# Patient Record
Sex: FEMALE | Race: WHITE | NOT HISPANIC OR LATINO | Employment: PART TIME | URBAN - METROPOLITAN AREA
[De-identification: names, ages, dates, MRNs, and addresses within clinical notes are randomized per-mention and may not be internally consistent; named-entity substitution may affect disease eponyms.]

---

## 2017-04-13 ENCOUNTER — APPOINTMENT (OUTPATIENT)
Dept: GENERAL RADIOLOGY | Facility: CLINIC | Age: 27
End: 2017-04-13
Attending: PHYSICIAN ASSISTANT

## 2017-04-13 ENCOUNTER — HOSPITAL ENCOUNTER (EMERGENCY)
Facility: CLINIC | Age: 27
Discharge: ANOTHER HOSPITAL | End: 2017-04-13
Attending: PHYSICIAN ASSISTANT | Admitting: PHYSICIAN ASSISTANT

## 2017-04-13 ENCOUNTER — HOSPITAL ENCOUNTER (OUTPATIENT)
Facility: CLINIC | Age: 27
Discharge: HOME | End: 2017-04-13
Attending: HOSPITALIST | Admitting: HOSPITALIST

## 2017-04-13 VITALS — DIASTOLIC BLOOD PRESSURE: 63 MMHG | OXYGEN SATURATION: 98 % | SYSTOLIC BLOOD PRESSURE: 113 MMHG | TEMPERATURE: 102.8 F

## 2017-04-13 VITALS
TEMPERATURE: 97.6 F | DIASTOLIC BLOOD PRESSURE: 83 MMHG | OXYGEN SATURATION: 97 % | SYSTOLIC BLOOD PRESSURE: 111 MMHG | RESPIRATION RATE: 12 BRPM

## 2017-04-13 DIAGNOSIS — I48.0 PAROXYSMAL ATRIAL FIBRILLATION (H): Primary | ICD-10-CM

## 2017-04-13 DIAGNOSIS — I48.91 ATRIAL FIBRILLATION WITH RAPID VENTRICULAR RESPONSE (H): Primary | ICD-10-CM

## 2017-04-13 LAB
ALBUMIN SERPL-MCNC: 3.9 G/DL (ref 3.4–5)
ALP SERPL-CCNC: 76 U/L (ref 40–150)
ALT SERPL W P-5'-P-CCNC: 38 U/L (ref 0–50)
AMPHETAMINES UR QL SCN: ABNORMAL
ANION GAP SERPL CALCULATED.3IONS-SCNC: 12 MMOL/L (ref 3–14)
AST SERPL W P-5'-P-CCNC: 23 U/L (ref 0–45)
BARBITURATES UR QL: ABNORMAL
BASOPHILS # BLD AUTO: 0 10E9/L (ref 0–0.2)
BASOPHILS NFR BLD AUTO: 0.3 %
BENZODIAZ UR QL: ABNORMAL
BILIRUB SERPL-MCNC: 0.7 MG/DL (ref 0.2–1.3)
BUN SERPL-MCNC: 18 MG/DL (ref 7–30)
CALCIUM SERPL-MCNC: 9 MG/DL (ref 8.5–10.1)
CANNABINOIDS UR QL SCN: ABNORMAL
CHLORIDE SERPL-SCNC: 108 MMOL/L (ref 94–109)
CO2 SERPL-SCNC: 22 MMOL/L (ref 20–32)
COCAINE UR QL: ABNORMAL
CREAT SERPL-MCNC: 0.71 MG/DL (ref 0.52–1.04)
DIFFERENTIAL METHOD BLD: ABNORMAL
EOSINOPHIL # BLD AUTO: 0.1 10E9/L (ref 0–0.7)
EOSINOPHIL NFR BLD AUTO: 0.4 %
ERYTHROCYTE [DISTWIDTH] IN BLOOD BY AUTOMATED COUNT: 12.1 % (ref 10–15)
GFR SERPL CREATININE-BSD FRML MDRD: ABNORMAL ML/MIN/1.7M2
GLUCOSE SERPL-MCNC: 105 MG/DL (ref 70–99)
HCG SERPL QL: NEGATIVE
HCT VFR BLD AUTO: 44.1 % (ref 35–47)
HGB BLD-MCNC: 15.5 G/DL (ref 11.7–15.7)
IMM GRANULOCYTES # BLD: 0 10E9/L (ref 0–0.4)
IMM GRANULOCYTES NFR BLD: 0.3 %
LYMPHOCYTES # BLD AUTO: 1.9 10E9/L (ref 0.8–5.3)
LYMPHOCYTES NFR BLD AUTO: 16.4 %
MAGNESIUM SERPL-MCNC: 2.3 MG/DL (ref 1.6–2.3)
MCH RBC QN AUTO: 32.4 PG (ref 26.5–33)
MCHC RBC AUTO-ENTMCNC: 35.1 G/DL (ref 31.5–36.5)
MCV RBC AUTO: 92 FL (ref 78–100)
MONOCYTES # BLD AUTO: 0.7 10E9/L (ref 0–1.3)
MONOCYTES NFR BLD AUTO: 5.6 %
NEUTROPHILS # BLD AUTO: 8.9 10E9/L (ref 1.6–8.3)
NEUTROPHILS NFR BLD AUTO: 77 %
OPIATES UR QL SCN: ABNORMAL
PCP UR QL SCN: ABNORMAL
PLATELET # BLD AUTO: 223 10E9/L (ref 150–450)
POTASSIUM SERPL-SCNC: 4 MMOL/L (ref 3.4–5.3)
PROT SERPL-MCNC: 7.6 G/DL (ref 6.8–8.8)
RBC # BLD AUTO: 4.78 10E12/L (ref 3.8–5.2)
SODIUM SERPL-SCNC: 142 MMOL/L (ref 133–144)
TROPONIN I SERPL-MCNC: NORMAL UG/L (ref 0–0.04)
TSH SERPL DL<=0.005 MIU/L-ACNC: 1.21 MU/L (ref 0.4–4)
WBC # BLD AUTO: 11.6 10E9/L (ref 4–11)

## 2017-04-13 PROCEDURE — 25000132 ZZH RX MED GY IP 250 OP 250 PS 637: Performed by: PHYSICIAN ASSISTANT

## 2017-04-13 PROCEDURE — 25000125 ZZHC RX 250: Performed by: EMERGENCY MEDICINE

## 2017-04-13 PROCEDURE — 71020 XR CHEST 2 VW: CPT

## 2017-04-13 PROCEDURE — 99285 EMERGENCY DEPT VISIT HI MDM: CPT | Mod: 25 | Performed by: EMERGENCY MEDICINE

## 2017-04-13 PROCEDURE — 96374 THER/PROPH/DIAG INJ IV PUSH: CPT

## 2017-04-13 PROCEDURE — 84443 ASSAY THYROID STIM HORMONE: CPT | Performed by: PHYSICIAN ASSISTANT

## 2017-04-13 PROCEDURE — 25000128 H RX IP 250 OP 636: Performed by: PHYSICIAN ASSISTANT

## 2017-04-13 PROCEDURE — 85025 COMPLETE CBC W/AUTO DIFF WBC: CPT | Performed by: PHYSICIAN ASSISTANT

## 2017-04-13 PROCEDURE — 25000132 ZZH RX MED GY IP 250 OP 250 PS 637: Performed by: EMERGENCY MEDICINE

## 2017-04-13 PROCEDURE — 83735 ASSAY OF MAGNESIUM: CPT | Performed by: PHYSICIAN ASSISTANT

## 2017-04-13 PROCEDURE — 96361 HYDRATE IV INFUSION ADD-ON: CPT

## 2017-04-13 PROCEDURE — 93010 ELECTROCARDIOGRAM REPORT: CPT | Performed by: EMERGENCY MEDICINE

## 2017-04-13 PROCEDURE — 80053 COMPREHEN METABOLIC PANEL: CPT | Performed by: PHYSICIAN ASSISTANT

## 2017-04-13 PROCEDURE — 99285 EMERGENCY DEPT VISIT HI MDM: CPT | Mod: 25

## 2017-04-13 PROCEDURE — 84703 CHORIONIC GONADOTROPIN ASSAY: CPT | Performed by: PHYSICIAN ASSISTANT

## 2017-04-13 PROCEDURE — 96376 TX/PRO/DX INJ SAME DRUG ADON: CPT

## 2017-04-13 PROCEDURE — 80307 DRUG TEST PRSMV CHEM ANLYZR: CPT | Performed by: EMERGENCY MEDICINE

## 2017-04-13 PROCEDURE — 96375 TX/PRO/DX INJ NEW DRUG ADDON: CPT

## 2017-04-13 PROCEDURE — 99219 ZZC INITIAL OBSERVATION CARE,LEVL II: CPT | Performed by: HOSPITALIST

## 2017-04-13 PROCEDURE — 99284 EMERGENCY DEPT VISIT MOD MDM: CPT | Mod: 25

## 2017-04-13 PROCEDURE — 93005 ELECTROCARDIOGRAM TRACING: CPT

## 2017-04-13 PROCEDURE — 25000128 H RX IP 250 OP 636: Performed by: EMERGENCY MEDICINE

## 2017-04-13 PROCEDURE — 84484 ASSAY OF TROPONIN QUANT: CPT | Performed by: PHYSICIAN ASSISTANT

## 2017-04-13 RX ORDER — METOPROLOL TARTRATE 25 MG/1
25 TABLET, FILM COATED ORAL ONCE
Status: COMPLETED | OUTPATIENT
Start: 2017-04-13 | End: 2017-04-13

## 2017-04-13 RX ORDER — ONDANSETRON 2 MG/ML
4 INJECTION INTRAMUSCULAR; INTRAVENOUS ONCE
Status: COMPLETED | OUTPATIENT
Start: 2017-04-13 | End: 2017-04-13

## 2017-04-13 RX ORDER — METOPROLOL TARTRATE 1 MG/ML
5 INJECTION, SOLUTION INTRAVENOUS EVERY 5 MIN PRN
Status: DISCONTINUED | OUTPATIENT
Start: 2017-04-13 | End: 2017-04-13 | Stop reason: HOSPADM

## 2017-04-13 RX ORDER — METOPROLOL TARTRATE 25 MG/1
25 TABLET, FILM COATED ORAL 2 TIMES DAILY
Qty: 60 TABLET | Refills: 0 | Status: SHIPPED | OUTPATIENT
Start: 2017-04-13 | End: 2018-10-16

## 2017-04-13 RX ADMIN — METOPROLOL TARTRATE 25 MG: 25 TABLET ORAL at 10:58

## 2017-04-13 RX ADMIN — METOPROLOL TARTRATE 25 MG: 25 TABLET ORAL at 12:22

## 2017-04-13 RX ADMIN — SODIUM CHLORIDE 1000 ML: 9 INJECTION, SOLUTION INTRAVENOUS at 10:30

## 2017-04-13 RX ADMIN — METOPROLOL TARTRATE 5 MG: 5 INJECTION INTRAVENOUS at 11:38

## 2017-04-13 RX ADMIN — ONDANSETRON 4 MG: 2 INJECTION INTRAMUSCULAR; INTRAVENOUS at 12:25

## 2017-04-13 RX ADMIN — METOPROLOL TARTRATE 5 MG: 5 INJECTION INTRAVENOUS at 10:39

## 2017-04-13 RX ADMIN — METOPROLOL TARTRATE 5 MG: 5 INJECTION INTRAVENOUS at 10:31

## 2017-04-13 ASSESSMENT — ENCOUNTER SYMPTOMS
ABDOMINAL PAIN: 0
NAUSEA: 1
CONSTITUTIONAL NEGATIVE: 1
SHORTNESS OF BREATH: 1
MUSCULOSKELETAL NEGATIVE: 1
NEUROLOGICAL NEGATIVE: 1
RHINORRHEA: 1
VOMITING: 1
CARDIOVASCULAR NEGATIVE: 1
FEVER: 0

## 2017-04-13 NOTE — ED NOTES
Pt walked to the bathroom, connected to monitor.  Continues to have atrial fib, rate 100 to 130s.  Rate varies at rest and with activity.  Nausea is gone now.  Pt tolerated walking without any symptoms.

## 2017-04-13 NOTE — IP AVS SNAPSHOT
MRN:[id number]                      After Visit Summary   4/13/2017    Felisha Zapata    MRN: [id number]           Thank you!     Thank you for choosing [facility] for your care. Our goal is always to provide you with excellent care. Hearing back from our patients is one way we can continue to improve our services. Please take a few minutes to complete the written survey that you may receive in the mail after you visit with us. Thank you!        Patient Information     Date Of Birth          1990        Designated Caregiver       Most Recent Value    Caregiver    Will someone help with your care after discharge? yes    Name of designated caregiver Mary Lou    Phone number of caregiver [phone]    [place] [address] [facility] [facility] [address]      About your hospital stay     You were admitted on:  April 13, 2017 You last received care in the:  [facility]    You were discharged on:  April 13, 2017        Reason for your hospital stay       You were admitted with atrial fibrillation with plans for cardioversion but you spontaneously coverted. You should follow up with cardiology in 1 week. Alcohol cessation is strongly encouraged.                  Who to Call     For medical emergencies, please call 911.  For non-urgent questions about your medical care, please call your primary care provider or clinic, [phone]          Attending Provider     Provider Specialty    Gabe Washington MD Internal Medicine       Primary Care Provider Office Phone # Fax #    Chayo Mccord [phone]       [facility] [address] [facility] [address]        After Care Instructions     Diet       Follow this diet upon discharge: Regular diet                  Follow-up Appointments     Adult [facility] Follow-up and recommended labs and tests       Follow up with cardiology clinic ([facility]) in 5-7 days.   "    Appointments on [facility] and/or [facility] (with [facility] or [facility] provider or service). Call [phone] if you haven't heard regarding these appointments within 7 days of discharge.                  Pending Results     No orders found from 2017 to 2017.            Statement of Approval     Ordered          17 1811  I have reviewed and agree with all the recommendations and orders detailed in this document.  EFFECTIVE NOW     Approved and electronically signed by:  Gabe Washington MD             Admission Information     Date & Time Provider Department Dept. Phone    2017 Gabe Washington MD [facility] [phone]      Your Vitals Were     Blood Pressure Temperature Pulse Oximetry             113/63 (BP Location: Right arm) 102.8  F (39.3  C) (Oral) 98%         MyChart Information     [facility] lets you send messages to your doctor, view your test results, renew your prescriptions, schedule appointments and more. To sign up, go to [email or web address] . Click on \"Log in\" on the left side of the screen, which will take you to the Welcome page. Then click on \"Sign up Now\" on the right side of the page.     You will be asked to enter the access code listed below, as well as some personal information. Please follow the directions to create your username and password.     Your access code is: [email or web address]  Expires: 2017  6:26 PM     Your access code will  in 90 days. If you need help or a new code, please call your [facility] clinic or [phone].        Care EveryWhere ID     This is your Care EveryWhere ID. This could be used by other organizations to access your [facility] medical records  [id number]           Review of your medicines      START taking        Dose / Directions    aspirin 81 MG EC tablet   Commonly known as:  aspirin EC Low Strength        Dose:  81 mg   Take 1 tablet (81 mg) by mouth daily   Quantity:  30 tablet "   Refills:  0       metoprolol 25 MG tablet   Commonly known as:  LOPRESSOR        Dose:  25 mg   Take 1 tablet (25 mg) by mouth 2 times daily   Quantity:  60 tablet   Refills:  0         CONTINUE these medicines which have NOT CHANGED        Dose / Directions    MIRENA (52 MG) 20 MCG/24HR IUD   Generic drug:  levonorgestrel        Applied 4-   Refills:  0            Where to get your medicines      Some of these will need a paper prescription and others can be bought over the counter. Ask your nurse if you have questions.     Bring a paper prescription for each of these medications     aspirin 81 MG EC tablet    metoprolol 25 MG tablet                Protect others around you: Learn how to safely use, store and throw away your medicines at www.disposemymeds.org.             Medication List: This is a list of all your medications and when to take them. Check marks below indicate your daily home schedule. Keep this list as a reference.      Medications           Morning Afternoon Evening Bedtime As Needed    aspirin 81 MG EC tablet   Commonly known as:  aspirin EC Low Strength   Take 1 tablet (81 mg) by mouth daily                                metoprolol 25 MG tablet   Commonly known as:  LOPRESSOR   Take 1 tablet (25 mg) by mouth 2 times daily                                MIRENA (52 MG) 20 MCG/24HR IUD   Applied 4-   Generic drug:  levonorgestrel                                          More Information        Discharge Instructions for Atrial Fibrillation  You have been diagnosed with atrial fibrillation. With this condition, your heart s two upper chambers quiver rather than squeeze the blood out in a normal pattern. This leads to an irregular and sometimes rapid heartbeat. Some people will develop associated symptoms such as a flip-flopping heartbeat, lightheadedness, or shortness of breath. Other people may have no symptoms at all. Atrial fibrillation is serious because it affects the heart s  ability to fill with blood as it should. Blood clots may form. This increases the risk for stroke. Untreated atrial fibrillation can also lead to heart failure. Atrial fibrillation can be controlled. With treatment, most people with atrial fibrillation lead normal lives. It is estimated that over 2.5 million Americans have atrial fibrillation.  Treatment options  Recommended treatment for atrial fibrillation depends on your age, symptoms, how long you have had atrial fibrillation, and other factors. You will have a complete evaluation to find out if you have any abnormalities that caused your heart to go into atrial fibrillation. This might be blocked heart arteries or a thyroid problem. Your doctor will assess your particular case and discuss choices with you.  Treatment choices may include:    Treating an underlying disorder that puts you at risk for atrial fibrillation. For example, correcting an abnormal thyroid or electrolyte problem, or treating a blocked heart artery.    Restoring a normal heart rhythm with an electrical shock (cardioversion) or with an antiarrhythmic medicine (chemical cardioversion)    Using medication to control your heart rate in atrial fibrillation.    Preventing the risk for blood clot and stroke using blood-thinning medicines. Your doctor will tell you what he or she recommends. Choices may include aspirin, clopidogrel, warfarin, dabigatran, rivaroxaban, or apixaban.    Doing catheter ablation or maze procedure. These use different methods to destroy certain areas of heart tissue. This interrupts the electrical signals causing atrial fibrillation. One of these procedures may be a choice when medicines do not work.    Other treatment choices may be recommended for you by your doctor.  Managing risk factors for stroke and preventing heart failure are important parts of any treatment plan for atrial fibrillation.  Home care    Take your medicines exactly as directed. Don t skip  doses.    Work with your doctor to find the right medicaines and doses for you.    Learn to take your own pulse. Keep a record of your results. Ask your doctor which pulse rates mean that you need medical attention. Slowing your pulse is often the goal of treatment. Ask your doctor if it s OK for you to use an automatic machine to check your pulse at home. Sometimes these machines don t count the pulse correctly when you have atrial fibrillation.    Limit your intake of coffee, tea, cola, and other beverages with caffeine to 2 cups per day. Talk with your doctor about whether you should eliminate caffeine.    Avoid over-the-counter medicines that have caffeine in them.    Let your doctor know what medicines you take, including prescription and over-the-counter medicines, as well as any supplements. They interfere with some medicines given for atrial fibrillation.    Ask your doctor about whether you can drink alcohol. Some people need to avoid alcohol to better treat atrial fibrillation. If you are taking blood-thinner medicines, alcohol may interfere with them by increasing their effect.    Never take stimulants such as amphetamines or cocaine. These drugs can speed up your heart rate and trigger atrial fibrillation.  Follow-up  Make a follow-up appointment as directed by our staff.     When to call your doctor  Call your doctor immediately if you have any of the following:    Weakness    Dizziness    Fainting    Fatigue    Shortness of breath    Chest pain with increased activity    A change in the usual regularity of your heartbeat, or an unusually fast heartbeat     8812-2047 [employer]. [address] [place] [address]707. All rights reserved. This information is not intended as a substitute for professional medical care. Always follow your healthcare professional's instructions.

## 2017-04-13 NOTE — ED PROVIDER NOTES
"  History     Chief Complaint   Patient presents with     Palpitations     feels like heart fluttering     HPI  Felisha Zapata is a 26 year old female with history of atrial fibrillation in 2013 who presents with complaints of palpitations since 6:30 AM this morning.  States her palpitations started while getting ready for the day.  Palpitations have persisted since that time.  Specifically describes the sensation as her \"heart fluttering.\"  Patient complains of associated dyspnea but no chest pain.  Denies fevers, chills, cough, abdominal pain, or leg pain/swelling.  Patient has history of atrial fibrillation with RVR in 2013 at which point she was transferred for BLAINE and elective cardioversion.  Patient has not been placed on any rate control medication since that time.  Patient states she has had intermittent palpitations since that time; often after drinking alcohol.  States she was out drinking with her friends last night.  Patient also complains of an episode of emesis on the way to the emergency department today.  States she is getting somewhat of a URI today.  Patient denies any illicit drug use aside from occasional marijuana use.  She states she typically drinks more than 5 alcoholic drinks/night twice a week.    I have reviewed the Medications, Allergies, Past Medical and Surgical History, and Social History in the Epic system.    Review of Systems   Constitutional: Negative.  Negative for fever.   HENT: Positive for rhinorrhea.    Respiratory: Positive for shortness of breath.         Palpitations   Cardiovascular: Negative.  Negative for chest pain.   Gastrointestinal: Positive for nausea and vomiting (one episode en route). Negative for abdominal pain.   Genitourinary: Negative.    Musculoskeletal: Negative.    Skin: Negative.    Neurological: Negative.    All other systems reviewed and are negative.      Physical Exam   BP: 145/74  Heart Rate: 55  Temp: 97.6  F (36.4  C)  Resp: 18  SpO2: 99 " %  Physical Exam   Constitutional: She is oriented to person, place, and time. She appears well-developed and well-nourished. No distress.   HENT:   Head: Normocephalic and atraumatic.   Right Ear: Tympanic membrane, external ear and ear canal normal.   Left Ear: Tympanic membrane, external ear and ear canal normal.   Nose: Nose normal. No rhinorrhea.   Mouth/Throat: Oropharynx is clear and moist. No oropharyngeal exudate or posterior oropharyngeal erythema.   Eyes: Conjunctivae and EOM are normal. Pupils are equal, round, and reactive to light. No scleral icterus.   Neck: Normal range of motion. Neck supple. No rigidity. No thyromegaly present.   Cardiovascular: Normal heart sounds.  An irregular rhythm present. Tachycardia present.  Exam reveals no gallop and no friction rub.    No murmur heard.  Pulmonary/Chest: Effort normal and breath sounds normal. No stridor. No respiratory distress. She has no wheezes. She has no rales.   Abdominal: Soft. There is no tenderness.   Musculoskeletal: Normal range of motion. She exhibits no edema or tenderness.   Lymphadenopathy:     She has no cervical adenopathy.   Neurological: She is alert and oriented to person, place, and time.   Skin: Skin is warm and dry.   Psychiatric: She has a normal mood and affect. Judgment normal.   Nursing note and vitals reviewed.      ED Course     ED Course     Procedures             EKG Interpretation:      Interpreted by Joanne Zamora  Time reviewed: 1021  Symptoms at time of EKG: palpitations  Rhythm: atrial fibrillation  Rate: 154  Axis: normal  Ectopy: none  Conduction: normal  ST Segments/ T Waves: No ST-T wave changes  Q Waves: none  Comparison to prior: Unchanged from most recent, although patient is in a fib today; no acute ischemic changes    Clinical Impression: Atrial fibrillation with RVR        Results for orders placed or performed during the hospital encounter of 04/13/17   XR Chest 2 Views    Narrative    XR CHEST 2 VW   4/13/2017 12:12 PM    HISTORY:  palpitations    COMPARISON:  2/26/2016      Impression    IMPRESSION:  Negative.     ART ARTHUR MD   Comprehensive metabolic panel   Result Value Ref Range    Sodium 142 133 - 144 mmol/L    Potassium 4.0 3.4 - 5.3 mmol/L    Chloride 108 94 - 109 mmol/L    Carbon Dioxide 22 20 - 32 mmol/L    Anion Gap 12 3 - 14 mmol/L    Glucose 105 (H) 70 - 99 mg/dL    Urea Nitrogen 18 7 - 30 mg/dL    Creatinine 0.71 0.52 - 1.04 mg/dL    GFR Estimate >90  Non  GFR Calc   >60 mL/min/1.7m2    GFR Estimate If Black >90   GFR Calc   >60 mL/min/1.7m2    Calcium 9.0 8.5 - 10.1 mg/dL    Bilirubin Total 0.7 0.2 - 1.3 mg/dL    Albumin 3.9 3.4 - 5.0 g/dL    Protein Total 7.6 6.8 - 8.8 g/dL    Alkaline Phosphatase 76 40 - 150 U/L    ALT 38 0 - 50 U/L    AST 23 0 - 45 U/L   CBC with platelets differential   Result Value Ref Range    WBC 11.6 (H) 4.0 - 11.0 10e9/L    RBC Count 4.78 3.8 - 5.2 10e12/L    Hemoglobin 15.5 11.7 - 15.7 g/dL    Hematocrit 44.1 35.0 - 47.0 %    MCV 92 78 - 100 fl    MCH 32.4 26.5 - 33.0 pg    MCHC 35.1 31.5 - 36.5 g/dL    RDW 12.1 10.0 - 15.0 %    Platelet Count 223 150 - 450 10e9/L    Diff Method Automated Method     % Neutrophils 77.0 %    % Lymphocytes 16.4 %    % Monocytes 5.6 %    % Eosinophils 0.4 %    % Basophils 0.3 %    % Immature Granulocytes 0.3 %    Absolute Neutrophil 8.9 (H) 1.6 - 8.3 10e9/L    Absolute Lymphocytes 1.9 0.8 - 5.3 10e9/L    Absolute Monocytes 0.7 0.0 - 1.3 10e9/L    Absolute Eosinophils 0.1 0.0 - 0.7 10e9/L    Absolute Basophils 0.0 0.0 - 0.2 10e9/L    Abs Immature Granulocytes 0.0 0 - 0.4 10e9/L   TSH with free T4 reflex   Result Value Ref Range    TSH 1.21 0.40 - 4.00 mU/L   Troponin I   Result Value Ref Range    Troponin I ES  0.000 - 0.045 ug/L     <0.015  The 99th percentile for upper reference range is 0.045 ug/L.  Troponin values in   the range of 0.045 - 0.120 ug/L may be associated with risks of adverse   clinical  "events.     HCG qualitative pregnancy (blood)   Result Value Ref Range    HCG Qualitative Serum Negative NEG   Magnesium   Result Value Ref Range    Magnesium 2.3 1.6 - 2.3 mg/dL   Drug Screen Urine   Result Value Ref Range    Amphetamine Qual Urine  NEG     Negative   Cutoff for a negative amphetamine is 500 ng/mL or less.      Barbiturates Qual Urine  NEG     Negative   Cutoff for a negative barbiturate is 200 ng/mL or less.      Benzodiazepine Qual Urine  NEG     Negative   Cutoff for a negative benzodiazepine is 200 ng/mL or less.      Cannabinoids Qual Urine (A) NEG     Positive   Cutoff for a positive cannabinoid is greater than 50 ng/mL. This is an   unconfirmed screening result to be used for medical purposes only.      Cocaine Qual Urine  NEG     Negative   Cutoff for a negative cocaine is 300 ng/mL or less.      Opiates Qualitative Urine  NEG     Negative   Cutoff for a negative opiate is 300 ng/mL or less.      PCP Qual Urine  NEG     Negative   Cutoff for a negative PCP is 25 ng/mL or less.           Assessments & Plan (with Medical Decision Making)     Pt is a 26 year old female with history of atrial fibrillation in 2013 who presents with complaints of palpitations since 6 AM this morning.  States her palpitations started while getting ready for the day.  Palpitations have persisted since that time.  Specifically describes the sensation as her \"heart fluttering.\"  Patient complains of associated dyspnea but no chest pain.  Patient has history of atrial fibrillation with RVR in 2013 at which point she was transferred for BLAINE cardioversion.  Patient has not been placed on any rate control medication since that time.  Patient states she has had intermittent palpitations since that time; often after drinking alcohol.  States she was out drinking with her friends last night.  Patient also complains of an episode of emesis on the way to the emergency department today.  Pt is afebrile on arrival.  Exam as " above.  Patient's heart rate is tachycardic into the 150s-170s and irregular on arrival.  EKG shows atrial fibrillation with RVR (rate in 150s).  Patient was given 2 doses of 5 mg IV Metoprolol with improvement in rate.  Patient's heart rate is now in the low 100s.  She was also given 25 mg oral Metoprolol.  She states she is better.  Troponin was undetectable.  Patient's white count is slightly elevated (11.6), otherwise CBC was unremarkable.  CMP was unremarkable.  TSH and magnesium were normal.  Urine drug screen was positive for cannabinoids.  Pregnancy test was negative.  Chest x-ray was negative.  Upon re-evaluation, pt's rate is up into the 130s once again.  She also endorses feeling symptomatic again when her rate increases.  Therefore discussed patient's case with [facility] cardiologist, Dr. Farias.  He recommended administering another 25 mg oral Metoprolol and continued monitoring as well as assessing pt's symptoms with ambulation.  Dr. Farias was contacted once again about an hour after this initial conversation.  We discussed that after another 25 mg oral metoprolol, patient's HR continues to to fluctuate from 90s up to 130s.  Also with ambulation, patient's heart rate is variable, peaking into the 130s.  Patient is also symptomatic when her heart rate becomes this high describing associated palpitations.  Dr. Farias excepts transfer of care at this time.  Also discussed pt's case with hospitalist Dr. Marie, who accepts admission with cardiology consultation.  Recommended patient be transferred by ambulance for continued cardiac monitoring, but patient states she does not have insurance at this time and refuses medical transport and elects to go by private vehicle instead.  Patient expresses understanding of the risks of going by private vehicle including symptomatic arrhythmia and stroke.    Patient was transferred in stable condition.    I have reviewed the nursing notes.    I have reviewed the  findings, diagnosis, plan with the patient.    Discharge Medication List as of 4/13/2017  2:11 PM          Final diagnoses:   Atrial fibrillation with rapid ventricular response (H)       4/13/2017   [facility] EMERGENCY DEPARTMENT     Joanne Zamora PA-C  04/13/17 9966    Physician Attestation   I, Christ Garza MD, saw and evaluated Felisha Zapata as part of a shared visit.  I have reviewed and discussed with the advanced practice provider their history, physical and plan.    I personally examined her and reviewed the vital signs, medications, EKG and labs.    My key history or physical exam findings: Atrial fibrillation with rapid ventricular rate.  Hemodynamically stable.    Key management decisions made by me: IV metoprolol boluses and metoprolol po for rate control.  Cardiology consultation.  Trial of ambulation in the ED to ventricular rate response after Metoprolol. Admission for cardiology consultation and further rate control management.    Christ Garza MD  Date of Service: 4/13/17     Christ Garza MD  04/14/17 0312

## 2017-04-13 NOTE — IP AVS SNAPSHOT
[facility]    [address] [place] [address]., [address]    [place] [address]    Phone:  [phone]                                       After Visit Summary   4/13/2017    Felisha Zapata    MRN: [id number]           After Visit Summary Signature Page     I have received my discharge instructions, and my questions have been answered. I have discussed any challenges I see with this plan with the nurse or doctor.    ..........................................................................................................................................  Patient/Patient Representative Signature      ..........................................................................................................................................  Patient Representative Print Name and Relationship to Patient    ..................................................               ................................................  Date                                            Time    ..........................................................................................................................................  Reviewed by Signature/Title    ...................................................              ..............................................  Date                                                            Time

## 2017-04-13 NOTE — ED NOTES
Pt woke up this morning with palpitations.  Has had atrial fibrillation in the past a few times, treated with fluids.   Needed cardioversion with 1 of the episodes.  Pt denies any pain.  Is having some nausea, small emesis on the way.  Pt had 2 alcohol drinks last evening.  She states this has triggered it in the past.  Pt used to drink 3 [employer] drinks in the past, has worked down to only 1 a day.  Pt appears to be feeling fine, no complaints.

## 2017-04-14 NOTE — PROGRESS NOTES
Patient discharged home  At 1946 accompanied by [facility]   Written instructions sent with patient - Yes and patient/family verbalized understanding.   Belongings sent with the patient Yes  Home medications sent with patient  - none  Prescriptions sent with patient to be feeled in pt's designated pharmacy

## 2017-04-14 NOTE — H&P
DATE OF ADMISSION:  04/13/2017   DATE OF DISCHARGE:  04/13/2017      SAME DAY OBSERVATION H&P AND DISCHARGE SUMMARY      PRIMARY CARE PHYSICIAN:  Chayo Mccord MD       DISCHARGE DIAGNOSES:     1.  Atrial fibrillation with rapid ventricular rate, spontaneously cardioverted.   2.  Fever, unclear etiology.  The patient refused further workup.      DISCHARGE MEDICATIONS:   1.  Aspirin 81 mg daily.   2.  Metoprolol 25 mg p.o. b.i.d.      EXAMINATION ON THE DAY OF DISCHARGE:   VITAL SIGNS:  Temperature 102.8 degrees Fahrenheit, heart rate of 83, blood pressure 113/63, saturation 98% on room air.   HEENT:  Pupils are equal and reactive to light and accommodation.  Extraocular movements are intact.  Oral mucosa is moist.   NECK:  Supple.  No JVD.   RESPIRATORY:  Lungs sounds bilaterally clear to auscultation, no wheezes or crepitation.   CARDIOVASCULAR:  Normal S1, S2, regular rate and rhythm, no murmur.   ABDOMEN:  Soft, nontender, nondistended, no guarding, rigidity or rebound tenderness.   LOWER EXTREMITIES:  With no edema.   NEUROLOGIC:  No focal neurological deficits noted.  Cranial nerves 2-12 grossly intact.   PSYCHIATRIC:  Normal mood and affect.      LABORATORY AND IMAGING:  CBC, BMP reviewed in Epic is mostly unremarkable except for a WBC count of 11.6.  Troponin I negative x1.  TSH 1.21.  Urine toxicology positive for cannabinoids.  Chest x-ray reviewed by me shows no acute infiltrates, effusion or pneumothorax.  EKG reviewed by me shows atrial fibrillation with rapid ventricular rate.      HOSPITAL COURSE:  Ms. Felisha Zapata is a 26-year-old female with past medical history significant for atrial fibrillation and cardioversion in 2013 who was transferred from [facility] for further evaluation of atrial fibrillation with rapid ventricular rate.  She had history of Afib and cardioversion back in 2013 and was on Toprol-XL for some time and then discontinued.  She has been having some intermittent  palpitations for several months, but usually lasts for only a few seconds, but today she started having palpitations which would not go away and she presented to the ER and was seen in Afib with RVR with heart rate in the 150s-170s.  She was given 3 doses of IV metoprolol and 50 mg of p.o. metoprolol.  She was still in Afib so she was sent to [facility] for plans for cardioversion; however, while she was here she spontaneously converted and wanted to go home.  I talked to the cardiologist on-call, Dr. Farias, who okayed for discharge home and recommended aspirin and metoprolol which has been prescribed.  She will follow up with Cardiology Clinic in 1 week.      Regarding fever, she denied any fever at home.  Here, she was noted with a temperature of 102.8 degrees Fahrenheit.  She does report some URI symptoms but did not have any fever at home.  Her chest x-ray looks good.  She denies any urinary symptoms.  No clear source of infection.  This could be viral URI.  She refused further workup or stay in the hospital and strongly wanted to be discharged.  I have suggested she come back to the ER for further evaluation if her fever is persistent or if she is concerned about her clinical symptoms.  Again, she refused to stay for further evaluation.  She has no other active medical issues.         FABIANA JENKINS MD             D: 2017 18:19   T: 2017 21:14   MT:       Name:     BILLIE YOUNG   MRN:      [id number]        Account:      [id number]   :      1990           Admitted:     [id number]      Document: [id number]       cc: Chayo Mccord MD

## 2017-08-18 ENCOUNTER — TELEPHONE (OUTPATIENT)
Dept: OBGYN | Facility: CLINIC | Age: 27
End: 2017-08-18

## 2017-08-18 NOTE — TELEPHONE ENCOUNTER
Pt is past due for fu pap smear  Reminder letter was sent 7/28/17  LMTC and schedule at [facility]  Left this writers number in case of questions  If no reply and/or appt within two weeks (9/1/17) patient will be considered lost to pap tracking f/u.  Richelle Ma,   Pap Tracking

## 2017-09-09 ENCOUNTER — HEALTH MAINTENANCE LETTER (OUTPATIENT)
Age: 27
End: 2017-09-09

## 2018-09-16 ENCOUNTER — HEALTH MAINTENANCE LETTER (OUTPATIENT)
Age: 28
End: 2018-09-16

## 2018-10-16 ENCOUNTER — OFFICE VISIT (OUTPATIENT)
Dept: FAMILY MEDICINE | Facility: CLINIC | Age: 28
End: 2018-10-16

## 2018-10-16 ENCOUNTER — RESULT FOLLOW UP (OUTPATIENT)
Dept: FAMILY MEDICINE | Facility: CLINIC | Age: 28
End: 2018-10-16

## 2018-10-16 VITALS
HEART RATE: 84 BPM | SYSTOLIC BLOOD PRESSURE: 116 MMHG | TEMPERATURE: 97.9 F | WEIGHT: 202 LBS | DIASTOLIC BLOOD PRESSURE: 74 MMHG | HEIGHT: 68 IN | BODY MASS INDEX: 30.62 KG/M2 | OXYGEN SATURATION: 96 %

## 2018-10-16 DIAGNOSIS — R87.612 PAPANICOLAOU SMEAR OF CERVIX WITH LOW GRADE SQUAMOUS INTRAEPITHELIAL LESION (LGSIL): ICD-10-CM

## 2018-10-16 DIAGNOSIS — Z30.431 SURVEILLANCE OF PREVIOUSLY PRESCRIBED INTRAUTERINE CONTRACEPTIVE DEVICE: ICD-10-CM

## 2018-10-16 DIAGNOSIS — Z00.00 ENCOUNTER FOR WELL ADULT EXAM WITHOUT ABNORMAL FINDINGS: Primary | ICD-10-CM

## 2018-10-16 LAB — BETA HCG QUAL IFA URINE: NEGATIVE

## 2018-10-16 PROCEDURE — 99395 PREV VISIT EST AGE 18-39: CPT | Performed by: FAMILY MEDICINE

## 2018-10-16 PROCEDURE — 84703 CHORIONIC GONADOTROPIN ASSAY: CPT | Performed by: FAMILY MEDICINE

## 2018-10-16 PROCEDURE — 88175 CYTOPATH C/V AUTO FLUID REDO: CPT | Performed by: FAMILY MEDICINE

## 2018-10-16 PROCEDURE — 88141 CYTOPATH C/V INTERPRET: CPT | Performed by: FAMILY MEDICINE

## 2018-10-16 PROCEDURE — 87624 HPV HI-RISK TYP POOLED RSLT: CPT | Performed by: FAMILY MEDICINE

## 2018-10-16 ASSESSMENT — ENCOUNTER SYMPTOMS
ARTHRALGIAS: 0
FREQUENCY: 0
FEVER: 0
COUGH: 0
HEADACHES: 0
HEMATURIA: 0
JOINT SWELLING: 0
DIARRHEA: 0
WEAKNESS: 0
PARESTHESIAS: 0
NERVOUS/ANXIOUS: 0
PALPITATIONS: 0
EYE PAIN: 0
HEMATOCHEZIA: 0
SHORTNESS OF BREATH: 0
SORE THROAT: 0
NAUSEA: 0
MYALGIAS: 0
ABDOMINAL PAIN: 0
HEARTBURN: 0
CONSTIPATION: 0
DYSURIA: 0
BREAST MASS: 0
DIZZINESS: 0
CHILLS: 0

## 2018-10-16 NOTE — PATIENT INSTRUCTIONS
Preventive Health Recommendations  Female Ages 26 - 39  Yearly exam:   See your health care provider every year in order to    Review health changes.     Discuss preventive care.      Review your medicines if you your doctor has prescribed any.    Until age 30: Get a Pap test every three years (more often if you have had an abnormal result).    After age 30: Talk to your doctor about whether you should have a Pap test every 3 years or have a Pap test with HPV screening every 5 years.   You do not need a Pap test if your uterus was removed (hysterectomy) and you have not had cancer.  You should be tested each year for STDs (sexually transmitted diseases), if you're at risk.   Talk to your provider about how often to have your cholesterol checked.  If you are at risk for diabetes, you should have a diabetes test (fasting glucose).  Shots: Get a flu shot each year. Get a tetanus shot every 10 years.   Nutrition:     Eat at least 5 servings of fruits and vegetables each day.    Eat whole-grain bread, whole-wheat pasta and brown rice instead of white grains and rice.    Get adequate Calcium and Vitamin D.     Lifestyle    Exercise at least 150 minutes a week (30 minutes a day, 5 days of the week). This will help you control your weight and prevent disease.    Limit alcohol to one drink per day.    No smoking.     Wear sunscreen to prevent skin cancer.    See your dentist every six months for an exam and cleaning.      Set up US to ID the IUD  Set up GYN for removal

## 2018-10-16 NOTE — LETTER
December 26, 2018      Felisha SANTIZO Benny  [address] [facility] [address] [facility] [address]    Dear ,      At [facility], your health and wellness is our primary concern. That is why we are following up on an abnormal pap from 10/16/18, which was reported as ASCUS and positive for high risk HPV 16. Your provider had recommended that you have a Colposcopy  completed by 1/16/19. Our records do not show that this has been scheduled.    It is important to complete the follow up that your provider has suggested for you to ensure that there are no worsening changes which may, over time, develop into cancer.      Please contact our office at  [phone] to schedule an appointment for a Colposcopy (this cannot be scheduled through [facility]) at your earliest convenience. If you have questions or concerns, please call the clinic and we will be happy to assist you.    If you have completed the tests outside of [facility], please have the results forwarded to our office. We will update the chart for your primary Physician to review before your next annual physical.     Thank you for choosing [facility]!    Sincerely,      Chayo Mccord MD/tala

## 2018-10-16 NOTE — PROGRESS NOTES
SUBJECTIVE:   CC: Felisha Zapata is an 28 year old woman who presents for preventive health visit.     Physical   Annual:     Getting at least 3 servings of Calcium per day:  Yes    Bi-annual eye exam:  NO    Dental care twice a year:  NO    Sleep apnea or symptoms of sleep apnea:  None    Diet:  Regular (no restrictions)    Frequency of exercise:  None    Taking medications regularly:  Yes    Medication side effects:  Not applicable    Additional concerns today:  No            Today's PHQ-2 Score:   PHQ-2 ( 1999 [employer]) 10/16/2018   Q1: Little interest or pleasure in doing things 0   Q2: Feeling down, depressed or hopeless 0   PHQ-2 Score 0   Q1: Little interest or pleasure in doing things Not at all   Q2: Feeling down, depressed or hopeless Not at all   PHQ-2 Score 0       Abuse: Current or Past(Physical, Sexual or Emotional)- No  Do you feel safe in your environment - Yes    Social History   Substance Use Topics     Smoking status: Current Every Day Smoker     Packs/day: 1.00     Years: 11.00     Types: Cigarettes     Start date: 1/13/2014     Smokeless tobacco: Never Used     Alcohol use 0.0 oz/week     0 Standard drinks or equivalent per week      Comment: mixed 4-10 a week     Alcohol Use 10/16/2018   If you drink alcohol do you typically have greater than 3 drinks per day OR greater than 7 drinks per week? No   No flowsheet data found.    Reviewed orders with patient.  Reviewed health maintenance and updated orders accordingly - Yes  Labs reviewed in EPIC    Mammogram not appropriate for this patient based on age.    Pertinent mammograms are reviewed under the imaging tab.  History of abnormal Pap smear:   YES - updated in Problem List and Health Maintenance accordingly  No LMP recorded. Patient is not currently having periods (Reason: IUD).    Last 3 Pap and HPV Results:   PAP / HPV Latest Ref Rng & Units 6/22/2016 10/1/2015 10/1/2015   PAP - NIL - NIL   HPV 16 DNA NEG Positive(A) Positive(A)  "Duplicate request(A)   HPV 18 DNA NEG Negative Negative Duplicate request(A)   OTHER HR HPV NEG Negative Negative Duplicate request(A)     PAP / HPV Latest Ref Rng & Units 6/22/2016 10/1/2015 10/1/2015   PAP - NIL - NIL   HPV 16 DNA NEG Positive(A) Positive(A) Duplicate request(A)   HPV 18 DNA NEG Negative Negative Duplicate request(A)   OTHER HR HPV NEG Negative Negative Duplicate request(A)     Reviewed and updated as needed this visit by clinical staff  Tobacco  Allergies  Meds  Problems  Med Hx  Surg Hx  Fam Hx  Soc Hx          Reviewed and updated as needed this visit by Provider  Allergies  Meds  Problems            Review of Systems   Constitutional: Negative for chills and fever.   HENT: Negative for congestion, ear pain, hearing loss and sore throat.    Eyes: Negative for pain.   Respiratory: Negative for cough and shortness of breath.    Cardiovascular: Negative for chest pain, palpitations and peripheral edema.   Gastrointestinal: Negative for abdominal pain, constipation, diarrhea, heartburn, hematochezia and nausea.   Breasts:  Negative for tenderness, breast mass and discharge.   Genitourinary: Negative for dysuria, frequency, genital sores, hematuria, pelvic pain, urgency, vaginal bleeding and vaginal discharge.   Musculoskeletal: Negative for arthralgias, joint swelling and myalgias.   Skin: Negative for rash.   Neurological: Negative for dizziness, weakness, headaches and paresthesias.   Psychiatric/Behavioral: Negative for mood changes. The patient is not nervous/anxious.    All other systems reviewed and are negative.         OBJECTIVE:   /74 (BP Location: Right arm, Patient Position: Chair, Cuff Size: Adult Large)  Pulse 84  Temp 97.9  F (36.6  C) (Tympanic)  Ht 5' 7.75\" (1.721 m)  Wt 202 lb (91.6 kg)  SpO2 96%  Breastfeeding? No  BMI 30.94 kg/m2  Physical Exam  GENERAL: healthy, alert and no distress  EYES: Eyes grossly normal to inspection, PERRL and conjunctivae and " "sclerae normal  HENT: ear canals and TM's normal, nose and mouth without ulcers or lesions  NECK: no adenopathy, no asymmetry, masses, or scars and thyroid normal to palpation  RESP: lungs clear to auscultation - no rales, rhonchi or wheezes  BREAST: normal without masses, tenderness or nipple discharge and no palpable axillary masses or adenopathy  CV: regular rate and rhythm, normal S1 S2, no S3 or S4, no murmur, click or rub, no peripheral edema and peripheral pulses strong  ABDOMEN: soft, nontender, no hepatosplenomegaly, no masses and bowel sounds normal   (female): normal female external genitalia, normal urethral meatus, vaginal mucosa pink, moist, well rugated, and normal cervix/adnexa/uterus without masses or discharge  IUD string not identified   MS: no gross musculoskeletal defects noted, no edema  SKIN: no suspicious lesions or rashes  NEURO: Normal strength and tone, mentation intact and speech normal  PSYCH: mentation appears normal, affect normal/bright    Diagnostic Test Results:  none     ASSESSMENT/PLAN:   1. Encounter for well adult exam without abnormal findings      2. Surveillance of previously prescribed intrauterine contraceptive device  Unable to view strings   - Beta HCG Qual, Urine - [facility] and [facility] [place] ([id number])  - US Pelvic Complete w Transvaginal; Future  - OB/GYN REFERRAL    COUNSELING:  Reviewed preventive health counseling, as reflected in patient instructions    BP Readings from Last 1 Encounters:   10/16/18 116/74     Estimated body mass index is 30.94 kg/(m^2) as calculated from the following:    Height as of this encounter: 5' 7.75\" (1.721 m).    Weight as of this encounter: 202 lb (91.6 kg).      Weight management plan: Discussed healthy diet and exercise guidelines and patient will follow up in 12 months in clinic to re-evaluate.     reports that she has been smoking Cigarettes.  She started smoking about 4 years ago. She has a 11.00 pack-year smoking history. She has never " used smokeless tobacco.  Tobacco Cessation Action Plan: Information offered: Patient not interested at this time    Counseling Resources:  ATP IV Guidelines  Pooled Cohorts Equation Calculator  Breast Cancer Risk Calculator  FRAX Risk Assessment  ICSI Preventive Guidelines  Dietary Guidelines for Americans, 2010  [employer]'s MyPlate  ASA Prophylaxis  Lung CA Screening    Chayo Mccord MD  [facility]

## 2018-10-16 NOTE — LETTER
April 2, 2019      Felisha SUKHDEV Zapata  [address] [facility] [address]    Dear ,      At [facility], your health and wellness is our primary concern. That is why we are following up on an abnormal pap from 10/16/18, which was reported as ASCUS and positive for high risk HPV 16. Your provider had recommended that you have a Colposcopy  completed by 1/16/19. Our records do not show that this has been done or  scheduled.    If you have chosen not to do the recommended colposcopy, please contact our office at [phone] to schedule an appointment for a repeat PAP smear and HPV test at your earliest convenience.    If you have completed the tests outside of [facility], please have the results forwarded to our office. We will update the chart for your primary Physician to review before your next annual physical.     Thank you for choosing [facility]!    Sincerely,      Your [facility] Care Team/[facility]

## 2018-10-16 NOTE — NURSING NOTE
"Chief Complaint   Patient presents with     Physical       Initial /74 (BP Location: Right arm, Patient Position: Chair, Cuff Size: Adult Large)  Pulse 84  Temp 97.9  F (36.6  C) (Tympanic)  Ht 5' 7.75\" (1.721 m)  Wt 202 lb (91.6 kg)  SpO2 96%  Breastfeeding? No  BMI 30.94 kg/m2 Estimated body mass index is 30.94 kg/(m^2) as calculated from the following:    Height as of this encounter: 5' 7.75\" (1.721 m).    Weight as of this encounter: 202 lb (91.6 kg).    Patient presents to the clinic using No DME    Health Maintenance that is potentially due pending provider review:  Pap Smear    n/a    Is there anyone who you would like to be able to receive your results? No  If yes have patient fill out JESSICA    "

## 2018-10-16 NOTE — LETTER
December 12, 2018      Felisha SANTIZO Benny  [address] [facility] [address]    Dear ,      At [facility], your health and wellness is our primary concern. That is why we are following up on an abnormal pap from 10/16/18, which was reported as ASCUS and positive for high risk HPV 16. Your provider had recommended that you have a Colposcopy  completed by 1/16/19. Our records do not show that this has been scheduled.    It is important to complete the follow up that your provider has suggested for you to ensure that there are no worsening changes which may, over time, develop into cancer.      Please contact our office at  [phone] to schedule an appointment for a Colposcopy (this cannot be scheduled through [facility]) at your earliest convenience. If you have questions or concerns, please call the clinic and we will be happy to assist you.    If you have completed the tests outside of [facility], please have the results forwarded to our office. We will update the chart for your primary Physician to review before your next annual physical.     Thank you for choosing [facility]!    Sincerely,      Chayo Mccord MD/tala

## 2018-10-16 NOTE — MR AVS SNAPSHOT
After Visit Summary   10/16/2018    Felisha Zapata    MRN: [id number]           Patient Information     Date Of Birth          1990        Visit Information        Provider Department      10/16/2018 1:40 PM Chayo Mccord MD [facility]        Today's Diagnoses     Encounter for well adult exam without abnormal findings    -  1    Surveillance of previously prescribed intrauterine contraceptive device          Care Instructions      Preventive Health Recommendations  Female Ages 26 - 39  Yearly exam:   See your health care provider every year in order to    Review health changes.     Discuss preventive care.      Review your medicines if you your doctor has prescribed any.    Until age 30: Get a Pap test every three years (more often if you have had an abnormal result).    After age 30: Talk to your doctor about whether you should have a Pap test every 3 years or have a Pap test with HPV screening every 5 years.   You do not need a Pap test if your uterus was removed (hysterectomy) and you have not had cancer.  You should be tested each year for STDs (sexually transmitted diseases), if you're at risk.   Talk to your provider about how often to have your cholesterol checked.  If you are at risk for diabetes, you should have a diabetes test (fasting glucose).  Shots: Get a flu shot each year. Get a tetanus shot every 10 years.   Nutrition:     Eat at least 5 servings of fruits and vegetables each day.    Eat whole-grain bread, whole-wheat pasta and brown rice instead of white grains and rice.    Get adequate Calcium and Vitamin D.     Lifestyle    Exercise at least 150 minutes a week (30 minutes a day, 5 days of the week). This will help you control your weight and prevent disease.    Limit alcohol to one drink per day.    No smoking.     Wear sunscreen to prevent skin cancer.    See your dentist every six months for an exam and cleaning.      Set up US to ID the  IUD  Set up GYN for removal           Follow-ups after your visit        Additional Services     OB/GYN REFERRAL       Your provider has referred you to:  [facility]: [facility] [phone]   [email or web address]    Please be aware that coverage of these services is subject to the terms and limitations of your health insurance plan.  Call member services at your health plan with any benefit or coverage questions.      Please bring the following with you to your appointment:    (1) Any X-Rays, CTs or MRIs which have been performed.  Contact the facility where they were done to arrange for  prior to your scheduled appointment.   (2) List of current medications   (3) This referral request   (4) Any documents/labs given to you for this referral                  Future tests that were ordered for you today     Open Future Orders        Priority Expected Expires Ordered    US Pelvic Complete w Transvaginal Routine  10/16/2019 10/16/2018            Who to contact     If you have questions or need follow up information about today's clinic visit or your schedule please contact [facility] directly at [phone].  Normal or non-critical lab and imaging results will be communicated to you by Coup[employer]hart, letter or phone within 4 business days after the clinic has received the results. If you do not hear from us within 7 days, please contact the clinic through Coupadhart or phone. If you have a critical or abnormal lab result, we will notify you by phone as soon as possible.  Submit refill requests through [employer] or call your pharmacy and they will forward the refill request to us. Please allow 3 business days for your refill to be completed.          Additional Information About Your Visit        [employer] Information     [employer] gives you secure access to your electronic health record. If you see a primary care provider, you can also send messages to your care team  "and make appointments. If you have questions, please call your primary care clinic.  If you do not have a primary care provider, please call [phone] and they will assist you.        Care EveryWhere ID     This is your Care EveryWhere ID. This could be used by other organizations to access your [facility] medical records  [id number]        Your Vitals Were     Pulse Temperature Height Pulse Oximetry Breastfeeding? BMI (Body Mass Index)    84 97.9  F (36.6  C) (Tympanic) 5' 7.75\" (1.721 m) 96% No 30.94 kg/m2       Blood Pressure from Last 3 Encounters:   10/16/18 116/74   04/13/17 113/63   04/13/17 111/83    Weight from Last 3 Encounters:   10/16/18 202 lb (91.6 kg)   08/09/16 196 lb (88.9 kg)   06/22/16 200 lb (90.7 kg)              We Performed the Following     Beta HCG Qual, Urine - [facility] ([id number])     OB/GYN REFERRAL        Primary Care Provider Office Phone # Fax #    Chayo Mccord [phone] [address] [place] [address]        Equal Access to Services     FEDERICO PRITCHARD AH: Hadii gaurav manzo hadasho Soomaali, waaxda luqadaha, qaybta kaalmada adeegyada, stephane cast. So [facility] [phone].    ATENCIÓN: Si habla español, tiene a pompa disposición servicios gratuitos de asistencia lingüística. Llame al [phone].    We comply with applicable federal civil rights laws and [place] laws. We do not discriminate on the basis of race, color, national origin, age, disability, sex, sexual orientation, or gender identity.            Thank you!     Thank you for choosing [facility]  for your care. Our goal is always to provide you with excellent care. Hearing back from our patients is one way we can continue to improve our services. Please take a few minutes to complete the written survey that you may receive in the mail after your visit with us. Thank you!             Your Updated Medication List - Protect others around you: " Learn how to safely use, store and throw away your medicines at www.disposemymeds.org.      Notice  As of 10/16/2018  2:33 PM    You have not been prescribed any medications.

## 2018-10-18 ENCOUNTER — HOSPITAL ENCOUNTER (OUTPATIENT)
Dept: ULTRASOUND IMAGING | Facility: CLINIC | Age: 28
Discharge: HOME | End: 2018-10-18
Attending: FAMILY MEDICINE | Admitting: FAMILY MEDICINE

## 2018-10-18 DIAGNOSIS — Z30.431 SURVEILLANCE OF PREVIOUSLY PRESCRIBED INTRAUTERINE CONTRACEPTIVE DEVICE: ICD-10-CM

## 2018-10-18 PROCEDURE — 76856 US EXAM PELVIC COMPLETE: CPT

## 2018-10-19 LAB
COPATH REPORT: ABNORMAL
PAP: ABNORMAL

## 2018-10-22 LAB
FINAL DIAGNOSIS: ABNORMAL
HPV HR 12 DNA CVX QL NAA+PROBE: NEGATIVE
HPV16 DNA SPEC QL NAA+PROBE: POSITIVE
HPV18 DNA SPEC QL NAA+PROBE: NEGATIVE
SPECIMEN DESCRIPTION: ABNORMAL
SPECIMEN SOURCE CVX/VAG CYTO: ABNORMAL

## 2018-10-23 NOTE — PROGRESS NOTES
10/1/2007:Pap--LSIL. Rec colpo.  1/3/2008:Pap--LSIL, + HPV 58. Rec Colpo  2/11/2008:Pap--LSIL. Colpo-SANA 3. Rec LEEP.  4/24/08:LEEP--SANA 3, + endocervical margins.   4/15/09:Pap--NIL. Repeat pap 1 year. Needs yearly paps for 20 years (2028).  1/20/10:pap--NIL. Repeat pap 1 year.  4/4/13:Pap--NIL. Pap in 1 year.  9/5/14:Pap--NIL, +HR HPV type 16. Plan colp  9/19/14: [place] - Negative for dysplasia. Plan cotest in 1 year.    10/1/2015:Pap--NIL, +HR HPV type 16. Plan [place]-  1/6/16: [place] not done. Tracking updated for 6 mo pap.   6/22/16: NIL Pap, + HR HPV 16. Plan colp  8/9/16: [place] [facility] - negative. Plan cotest in 1 year.   10/16/18 ASCUS Pap, + HR HPV 16 (neg 18 & other). Plan colp  10/23/18 Pt notified.   12/12/18 My Chart Colposcopy Reminder message sent (rlm)  12/26/18 My Chart not read, reminder letter sent (rlm)  1/16/19 3mo [place] not done, updated to 6mo [place]/Pap due by 4/16/19 (rlm) FYI sent to provider. (toan)  04/02/19 [place]/pap reminder letter sent. (es)  5/10/19 Pap Follow up reminder call placed, voicemail left (rlm)  6/7/19 Patient is lost to follow-up. Routed to provider as FYI. (rlm)

## 2019-01-30 ENCOUNTER — OFFICE VISIT (OUTPATIENT)
Dept: FAMILY MEDICINE | Facility: CLINIC | Age: 29
End: 2019-01-30

## 2019-01-30 VITALS
WEIGHT: 207 LBS | TEMPERATURE: 97.3 F | HEART RATE: 72 BPM | BODY MASS INDEX: 31.37 KG/M2 | OXYGEN SATURATION: 99 % | RESPIRATION RATE: 14 BRPM | SYSTOLIC BLOOD PRESSURE: 130 MMHG | DIASTOLIC BLOOD PRESSURE: 88 MMHG | HEIGHT: 68 IN

## 2019-01-30 DIAGNOSIS — K04.7 DENTAL INFECTION: Primary | ICD-10-CM

## 2019-01-30 DIAGNOSIS — S02.5XXA CLOSED FRACTURE OF TOOTH, INITIAL ENCOUNTER: ICD-10-CM

## 2019-01-30 PROCEDURE — 99214 OFFICE O/P EST MOD 30 MIN: CPT | Performed by: NURSE PRACTITIONER

## 2019-01-30 ASSESSMENT — MIFFLIN-ST. JEOR: SCORE: 1699.51

## 2019-01-30 NOTE — PATIENT INSTRUCTIONS
Patient Education     [place] Teeth: Removal  [place] teeth are often removed (extracted) in a surgeon s office or in an outpatient surgical center. Your experience depends on the position of the teeth, the number of teeth being removed, and other factors. Your surgeon may advise removing all of your wisdom teeth in a single procedure, even if they are not all causing problems. Or your surgeon may advise separate procedures for each side of the mouth.     The wisdom tooth is removed through an incision. The incision is closed with stitches (sutures).      Preparing for surgery  Your surgeon can tell you how long the surgery is likely to take. Including recovery from anesthesia, it may last between 45 minutes and 2 hours. Before surgery, be sure to:    Arrange time off from work or school. You ll need a day or more to rest and begin to heal.    Tell your surgeon about any medicines you normally take. Your surgeon may advise some medicine changes.    Follow any directions you are given for not eating or drinking before surgery.    Wear loose, comfortable clothing. Choose a shirt or blouse with short sleeves. This makes it easier to put in an IV (intravenous) line.    Arrange for a ride home. An adult family member or friend should drive you home after surgery. Don t drive yourself, and don t take public transportation. The person who drives you should wait in the reception area during surgery.    Tell your surgeon if you have had any bad reactions to pain medicines that he plans to prescribe. Following surgery you may need prescription pain medicine.   How your tooth may be removed  A tooth can be removed in different ways. Details of the procedure will depend on:    The position of the tooth.    Whether the tooth has broken through the gum (erupted).    How deeply the tooth is embedded in the bone.    How close the roots of the tooth are to the sinuses or certain nerves or blood vessels.  Removing the tooth  An  incision may be made in the gum. This creates a flap of gum tissue that can be folded back to expose the bone and the tooth. In some cases, the surgeon may be able to loosen the tooth and remove it with forceps. The tooth may need to be cut into pieces (sectioned). Bone around the tooth may also need to be removed. In rare cases, only the crown of the tooth is removed (coronectomy). After the tooth has been removed, any incision that was made is closed with stitches (sutures).  Anesthesia options  The type of anesthesia you receive depends on your surgeon s recommendation and your preference. Your insurance coverage may also be a factor. Tell your surgeon if you have had problems with anesthesia in the past. Types of anesthesia include:    Local anesthesia. This numbs the area around the tooth to be removed. Local anesthesia is used even if another type of anesthesia is also given to you.    Sedative. This helps you stay relaxed but awake during surgery. Nitrous oxide (also called laughing gas) is one type of sedative. Other sedatives are given in pill form or by IV.    General anesthesia. This puts you to sleep during surgery. Your surgeon may advise using it if the removal is likely to be difficult. Or it may be an option if you prefer to be asleep.      Date Last Reviewed: 8/1/2017 2000-2018 [employer]. [address] [place] [address]. All rights reserved. This information is not intended as a substitute for professional medical care. Always follow your healthcare professional's instructions.           Patient Education     Dental Abscess with Facial Cellulitis    A dental abscess is an infection at the base of a tooth. It means a pocket of pus has formed at the tip of a tooth root in your jaw bone. If the infection isn t treated, it can appear as a swelling on the gum near the tooth. More serious infections spread to the face. This causes your face to swell (cellulitis). This is a  "very serious condition. Once the swelling begins, it can spread quickly.  A dental abscess usually starts with a crack or cavity in a tooth. The pain is often made worse by drinking hot or cold beverages, or biting on hard foods. The pain may spread from the tooth to your ear or the area of your jaw on the same side.  Home care  Follow these tips when caring for yourself at home:    Don't have hot and cold foods and drinks. Your tooth may be sensitive to changes in temperature. Don t chew on the side of the infected tooth.    If your tooth is chipped or cracked, or if there is a large open cavity, put oil of cloves directly on the tooth to relieve pain. You can buy oil of cloves at drugstores. Some pharmacies carry an over-the-counter \"toothache kit.\" This contains a paste that you can put on the exposed tooth to make it less sensitive.    Put a cold pack on your jaw over the sore area to help reduce pain.    You may use over-the-counter medication to ease pain, unless another medicine was prescribed. If you have chronic liver or kidney disease, talk with your health care provider before using acetaminophen or ibuprofen. Also talk with your provider if you ve had a stomach ulcer or GI bleeding.    An antibiotic will be prescribed. Take it exactly as directed. Don t miss any doses.  Follow-up care  Follow up with your dentist or an oral surgeon as advised. Severe cases of cellulitis must be checked again within 24 hours. Once an infection occurs in a tooth, it will continue to be a problem until the infection is drained. This is done through surgery or a root canal. Or you may need to have your tooth pulled.  Call 911  Call 911 if any of these occur:    Swelling spreads to the upper half of your face or neck    You eyelids begin to swell shut    Unusual drowsiness    Headache or a stiff neck    Weakness or fainting    Difficulty swallowing or breathing  When to seek medical advice  Call your healthcare provider right " away if any of these occur:    Pain gets worse or spreads to your neck    Fever of 100.4 F (38 C) or higher, or as directed by your healthcare provider  Date Last Reviewed: 10/1/2016    0308-7657 [employer]. [address] [place] [address], [place] [address]. All rights reserved. This information is not intended as a substitute for professional medical care. Always follow your healthcare professional's instructions.

## 2019-01-30 NOTE — PROGRESS NOTES
"  SUBJECTIVE:   Felisha Zapata is a 28 year old female who presents to clinic today for the following health issues:      DENTAL PAIN      Duration: 2 days pain started but broke tooth about 2 weeks ago     Description (location/character/radiation): broken wisdom tooth     Intensity:  severe    Accompanying signs and symptoms: pain and swelling     History (similar episodes/previous evaluation): broken wisdom tooth     Precipitating or alleviating factors: None    Therapies tried and outcome: IBU        History of intermittent atrial fibrillation no recent symptoms  Smoker.  Not interested in quitting.  No dental insurance.  No medical insurance.    -------------------------------------    Problem list and histories reviewed & adjusted, as indicated.  Additional history: as documented    Labs reviewed in EPIC    Reviewed and updated as needed this visit by clinical staff  Allergies  Meds       Reviewed and updated as needed this visit by Provider         ROS:   ROS: 10 point ROS neg other than the symptoms noted above in the HPI.      OBJECTIVE:                                                    /88   Pulse 72   Temp 97.3  F (36.3  C) (Tympanic)   Resp 14   Ht 1.715 m (5' 7.5\")   Wt 93.9 kg (207 lb)   SpO2 99%   BMI 31.94 kg/m    Body mass index is 31.94 kg/m .   GENERAL: healthy, alert, well nourished, well hydrated, mild distress  HENT: ear canals- normal; TMs- normal; Nose- normal; Mouth-erythema right lower mandible with fractured wisdom tooth no ulcers, no lesions  NECK: no tenderness, no adenopathy, no asymmetry, no masses, no stiffness; thyroid- normal to palpation  RESP: lungs clear to auscultation - no rales, no rhonchi, no wheezes  CV: regular rates and rhythm, normal S1 S2, no S3 or S4 and no murmur, no click or rub -  ABDOMEN: soft, no tenderness, no  hepatosplenomegaly, no masses, normal bowel sounds    Diagnostic test results:  none      ASSESSMENT/PLAN:                           "                          1. Dental infection  2. Closed fracture of tooth, initial encounter  Symptomatic care strategies reviewed.  Call and schedule follow-up with the dental office ASAP  Stop smoking  Begin oral antibiotics  - amoxicillin-clavulanate (AUGMENTIN) 875-125 MG tablet; Take 1 tablet by mouth 2 times daily for 10 days  Dispense: 20 tablet; Refill: 0            Follow up with Provider - Call or return to the clinic with any worsening of symptoms or no resolution. Patient/Parent verbalized understanding and is in agreement. Medication side effects reviewed.   Current Outpatient Medications   Medication Sig Dispense Refill     amoxicillin-clavulanate (AUGMENTIN) 875-125 MG tablet Take 1 tablet by mouth 2 times daily for 10 days 20 tablet 0        See Patient Instructions    RADHA [facility]

## 2019-05-10 ENCOUNTER — TELEPHONE (OUTPATIENT)
Dept: FAMILY MEDICINE | Facility: CLINIC | Age: 29
End: 2019-05-10

## 2019-05-10 DIAGNOSIS — R87.610 ASCUS WITH POSITIVE HIGH RISK HPV CERVICAL: ICD-10-CM

## 2019-05-10 DIAGNOSIS — R87.810 ASCUS WITH POSITIVE HIGH RISK HPV CERVICAL: ICD-10-CM

## 2019-05-10 NOTE — TELEPHONE ENCOUNTER
Pt is past due for Pap follow up  Reminder letter has been sent  LMTC her clinic with any questions or to schedule    Richelle Ma,   Pap Tracking

## 2020-01-24 ENCOUNTER — OFFICE VISIT (OUTPATIENT)
Dept: FAMILY MEDICINE | Facility: CLINIC | Age: 30
End: 2020-01-24
Payer: MEDICAID

## 2020-01-24 VITALS
TEMPERATURE: 98.6 F | HEIGHT: 68 IN | RESPIRATION RATE: 15 BRPM | HEART RATE: 64 BPM | DIASTOLIC BLOOD PRESSURE: 84 MMHG | SYSTOLIC BLOOD PRESSURE: 112 MMHG | WEIGHT: 221 LBS | BODY MASS INDEX: 33.49 KG/M2

## 2020-01-24 DIAGNOSIS — Z30.431 INTRAUTERINE DEVICE SURVEILLANCE: Primary | ICD-10-CM

## 2020-01-24 DIAGNOSIS — R87.810 ASCUS WITH POSITIVE HIGH RISK HPV CERVICAL: ICD-10-CM

## 2020-01-24 DIAGNOSIS — R87.610 ASCUS WITH POSITIVE HIGH RISK HPV CERVICAL: ICD-10-CM

## 2020-01-24 PROCEDURE — 99214 OFFICE O/P EST MOD 30 MIN: CPT | Performed by: FAMILY MEDICINE

## 2020-01-24 ASSESSMENT — MIFFLIN-ST. JEOR: SCORE: 1758.01

## 2020-01-24 NOTE — NURSING NOTE
"Chief Complaint   Patient presents with     IUD     Colposcopy       Initial /84 (BP Location: Right arm, Patient Position: Chair, Cuff Size: Adult Large)   Pulse 64   Temp 98.6  F (37  C) (Tympanic)   Resp 15   Ht 1.715 m (5' 7.5\")   Wt 100.2 kg (221 lb)   BMI 34.10 kg/m   Estimated body mass index is 34.1 kg/m  as calculated from the following:    Height as of this encounter: 1.715 m (5' 7.5\").    Weight as of this encounter: 100.2 kg (221 lb).    Patient presents to the clinic using No DME    Health Maintenance that is potentially due pending provider review:  Pap Smear and colposcopy    Possibly completing today per provider review.    Is there anyone who you would like to be able to receive your results? No  If yes have patient fill out JESSICA    "

## 2020-01-24 NOTE — PROGRESS NOTES
Subjective     Felisha Zapata is a 29 year old female who presents to clinic today for the following health issues:    HPI   IUD removal and replacement. Colposcopy    Pt with the below pap history and is over due for colpo    She also is 5 years overdue in getting her mirena out.   She was here in 2018 for removal and IUD string not visible had an US at that time and referred to gyn for removal and she did not make that appt.     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 Rec colpo.  1/3/2008:Pap--LSIL, + HPV 58. Rec Colpo  2/11/2008:Pap--LSIL. Colpo-SANA 3. Rec LEEP.  4/24/08:LEEP--SANA 3, + endocervical margins.   4/15/09:Pap--NIL. Repeat pap 1 year. Needs yearly paps for 20 years (2028).  1/20/10:pap--NIL. Repeat pap 1 year.  10/27/11 NIL Pap, + HR HPV. (Care Everywhere)  4/4/13:Pap--NIL. Pap in 1 year.  9/5/14:Pap--NIL, +HR HPV type 16. Plan colp  9/19/14: [place] - Negative for dysplasia. Plan cotest in 1 year.    10/1/2015:Pap--NIL, +HR HPV type 16. Plan [place]-  1/6/16: [place] not done. Tracking updated for 6 mo pap.   6/22/16: NIL Pap, + HR HPV 16. Plan colp  8/9/16: [place] ECC - negative. Plan cotest in 1 year.   10/16/18 ASCUS Pap, + HR HPV 16 (neg 18 & other). Plan colp  6/7/19 Lost to follow-up for pap tracking         BMI 30.0-30.9,adult 10/16/2006     Priority: Medium     Tobacco use disorder 05/09/2005     Priority: Medium                 Reviewed and updated as needed this visit by Provider  Tobacco  Allergies  Meds  Problems  Med Hx  Surg Hx  Fam Hx         Review of Systems   ROS COMP: Constitutional, HEENT, cardiovascular, pulmonary, gi and gu systems are negative, except as otherwise noted.     "  Objective    /84 (BP Location: Right arm, Patient Position: Chair, Cuff Size: Adult Large)   Pulse 64   Temp 98.6  F (37  C) (Tympanic)   Resp 15   Ht 1.715 m (5' 7.5\")   Wt 100.2 kg (221 lb)   BMI 34.10 kg/m    Body mass index is 34.1 kg/m .  Physical Exam   GENERAL APPEARANCE: healthy, alert and no distress   (female): normal cervix, adnexae, and uterus without masses or discharge and no iud string visualized endocervical speculum is used   PSYCH: mentation appears normal and affect normal/bright    Diagnostic Test Results:  Labs reviewed in Epic        Assessment & Plan     1. Intrauterine device surveillance  Need to verify device in the uterus  - US Pelvic Complete w Transvaginal; Future  - OB/GYN REFERRAL    2. ASCUS with positive high risk HPV cervical  Needs colpo     Discussed with pt that we will get US and then have her see gyn for iud removal replacement and colpo        Tobacco Cessation:   reports that she has been smoking cigarettes. She started smoking about 6 years ago. She has a 11.00 pack-year smoking history. She has never used smokeless tobacco.  Tobacco Cessation Action Plan: Information offered: Patient not interested at this time      BMI:   Estimated body mass index is 34.1 kg/m  as calculated from the following:    Height as of this encounter: 1.715 m (5' 7.5\").    Weight as of this encounter: 100.2 kg (221 lb).   Weight management plan: Discussed healthy diet and exercise guidelines            Return in about 1 week (around 1/31/2020), or if symptoms worsen or fail to improve.    Chayo Mccord MD  [facility]      "

## 2020-01-27 ENCOUNTER — HOSPITAL ENCOUNTER (OUTPATIENT)
Dept: ULTRASOUND IMAGING | Facility: CLINIC | Age: 30
Discharge: HOME | End: 2020-01-27
Attending: FAMILY MEDICINE | Admitting: FAMILY MEDICINE
Payer: MEDICAID

## 2020-01-27 DIAGNOSIS — Z30.431 INTRAUTERINE DEVICE SURVEILLANCE: ICD-10-CM

## 2020-01-27 PROCEDURE — 76830 TRANSVAGINAL US NON-OB: CPT

## 2020-02-10 ENCOUNTER — HEALTH MAINTENANCE LETTER (OUTPATIENT)
Age: 30
End: 2020-02-10

## 2020-02-13 ENCOUNTER — RESULT FOLLOW UP (OUTPATIENT)
Dept: OBGYN | Facility: CLINIC | Age: 30
End: 2020-02-13

## 2020-02-13 ENCOUNTER — OFFICE VISIT (OUTPATIENT)
Dept: OBGYN | Facility: CLINIC | Age: 30
End: 2020-02-13
Payer: MEDICAID

## 2020-02-13 VITALS
RESPIRATION RATE: 16 BRPM | TEMPERATURE: 97.9 F | BODY MASS INDEX: 33.95 KG/M2 | WEIGHT: 224 LBS | DIASTOLIC BLOOD PRESSURE: 87 MMHG | SYSTOLIC BLOOD PRESSURE: 128 MMHG | HEART RATE: 87 BPM | HEIGHT: 68 IN

## 2020-02-13 DIAGNOSIS — R87.810 ASCUS WITH POSITIVE HIGH RISK HPV CERVICAL: Primary | ICD-10-CM

## 2020-02-13 DIAGNOSIS — R87.810 ASCUS WITH POSITIVE HIGH RISK HPV CERVICAL: ICD-10-CM

## 2020-02-13 DIAGNOSIS — R87.610 ASCUS WITH POSITIVE HIGH RISK HPV CERVICAL: ICD-10-CM

## 2020-02-13 DIAGNOSIS — Z30.431 SURVEILLANCE OF PREVIOUSLY PRESCRIBED INTRAUTERINE CONTRACEPTIVE DEVICE: ICD-10-CM

## 2020-02-13 DIAGNOSIS — Z30.433 ENCOUNTER FOR REMOVAL AND REINSERTION OF INTRAUTERINE CONTRACEPTIVE DEVICE: ICD-10-CM

## 2020-02-13 DIAGNOSIS — R87.610 ASCUS WITH POSITIVE HIGH RISK HPV CERVICAL: Primary | ICD-10-CM

## 2020-02-13 PROCEDURE — 58300 INSERT INTRAUTERINE DEVICE: CPT | Performed by: OBSTETRICS & GYNECOLOGY

## 2020-02-13 PROCEDURE — 58301 REMOVE INTRAUTERINE DEVICE: CPT | Performed by: OBSTETRICS & GYNECOLOGY

## 2020-02-13 PROCEDURE — 87624 HPV HI-RISK TYP POOLED RSLT: CPT | Performed by: OBSTETRICS & GYNECOLOGY

## 2020-02-13 PROCEDURE — 88175 CYTOPATH C/V AUTO FLUID REDO: CPT | Performed by: OBSTETRICS & GYNECOLOGY

## 2020-02-13 PROCEDURE — 88141 CYTOPATH C/V INTERPRET: CPT | Performed by: OBSTETRICS & GYNECOLOGY

## 2020-02-13 ASSESSMENT — MIFFLIN-ST. JEOR: SCORE: 1771.62

## 2020-02-13 NOTE — PROGRESS NOTES
SUBJECTIVE:  Patient previously seen in FP clinic and no strings are visible.  She had a pelvic U/S on 2020 that confirmed its presence in the uterus.  She is also due for pap testing.     Pap hx:  10/1/2007:Pap--LSIL. Rec colpo.  1/3/2008:Pap--LSIL, + HPV 58. Rec Colpo  2008:Pap--LSIL. Colpo-SANA 3. Rec LEEP.  08:LEEP--SANA 3, + endocervical margins.   4/15/09:Pap--NIL. Repeat pap 1 year. Needs yearly paps for 20 years ().  1/20/10:pap--NIL. Repeat pap 1 year.  10/27/11 NIL Pap, + HR HPV. (Care Everywhere)  13:Pap--NIL. Pap in 1 year.  14:Pap--NIL, +HR HPV type 16. Plan colp  14: [place] - Negative for dysplasia. Plan cotest in 1 year.    10/1/2015:Pap--NIL, +HR HPV type 16. Plan [place]-  16: [place] not done. Tracking updated for 6 mo pap.   16: NIL Pap, + HR HPV 16. Plan colp  16: [place] ECC - negative. Plan cotest in 1 year.   10/16/18 ASCUS Pap, + HR HPV 16 (neg 18 & other). Plan colp  19 Lost to follow-up for pap tracking      Is a pregnancy test required: No.  Was a consent obtained?  Yes    Subjective: Felisha Zapata is a 29 year old  presents for IUD and desires Mirena type IUD.  She requests removal of the IUD because the IUD effectiveness has     Patient has been given the opportunity to ask questions about all forms of birth control, including all options appropriate for Felisha Zapata. Discussed that no method of birth control, except abstinence is 100% effective against pregnancy or sexually transmitted infection.     Felisha Zapata understands she may have the IUD removed at any time. IUD should be removed by a health care provider and the current IUD will be removed today.    The entire removal and insertion procedure was reviewed with the patient, including care after placement.    Today's PHQ-2 Score:   PHQ-2 (  [employer]) 10/16/2018   Q1: Little interest or pleasure in doing things 0   Q2: Feeling down, depressed or  hopeless 0   PHQ-2 Score 0   Q1: Little interest or pleasure in doing things Not at all   Q2: Feeling down, depressed or hopeless Not at all   PHQ-2 Score 0       PROCEDURE:    A speculum exam was performed and the cervix was visualized. Pap smear sample was obtained.  The IUD string was not visualized. Using packing forceps, the string was grasped and the IUD removed intact.    Under sterile technique, cervix was visualized with speculum and prepped with Betadine solution swab x 3.  The uterus sounded to 7.5 cm. IUD prepared for placement, and IUD inserted according to 's instructions without difficulty or significant ressitance, and deployed at the fundus. The strings were visualized and trimmed to 2.0 cm from the external os. Tenaculum was removed and hemostasis noted. Speculum removed.  Patient tolerated procedure well.    Lot # [id number]  Exp: 4/2022    EBL: minimal    Complications: none      POST PROCEDURE:    Given 's handouts, including when to have IUD removed, list of danger s/sx, side effects and follow up recommended. Encouraged condom use for prevention of STD. Advised to call for any fever, for prolonged or severe pain or bleeding, abnormal vaginal dischage, or unable to palpate strings. She was advised to use pain medications (ibuprofen) as needed for mild to moderate pain. Advised to follow-up in clinic in 4-6 weeks for IUD string check if unable to find strings or as directed by provider.     Chacha Meredith MD

## 2020-02-13 NOTE — LETTER
July 14, 2020      Felisha Zapata  [address] [facility] [address]      Dear Ms. Zapata,    At [facility], your health and wellness is our primary concern. That is why we are following up on an abnormal pap  from 02/13/20, which was reported as ASCUS and positive for high-risk HPV 16.  Your Provider had recommended that you have a Colposcopy  completed by 05/13/20.    COVID-19 scheduling restrictions have been revised and we are now able to make this appointment at limited clinic sites:    [place]  [phone]    [place] [phone]   [facility] ([place]) [phone]   [place] [phone] ([facility] for women's clinic)   [place] [phone]   [place] [phone]   [place] [phone]     It is important to complete the follow up that your provider has suggested for you to ensure that there are no worsening changes which may, over time, develop into cancer.      Please call your preferred clinic from the list above to schedule an appointment for a Colposcopy (this cannot be scheduled through [facility]) at your earliest convenience. If you have questions or concerns, please call the clinic and we will be happy to assist you.    If you have completed the tests outside of [facility], please have the results forwarded to our office. We will update the chart for your primary Physician to review before your next annual physical.     Thank you for choosing [facility]!    Sincerely,      Your [facility] Care Team//[facility]

## 2020-02-15 ENCOUNTER — HOSPITAL ENCOUNTER (EMERGENCY)
Facility: CLINIC | Age: 30
Discharge: HOME | End: 2020-02-15
Attending: EMERGENCY MEDICINE | Admitting: EMERGENCY MEDICINE
Payer: MEDICAID

## 2020-02-15 VITALS
SYSTOLIC BLOOD PRESSURE: 107 MMHG | RESPIRATION RATE: 23 BRPM | TEMPERATURE: 98.3 F | OXYGEN SATURATION: 95 % | HEART RATE: 78 BPM | DIASTOLIC BLOOD PRESSURE: 70 MMHG | WEIGHT: 220 LBS | BODY MASS INDEX: 33.95 KG/M2

## 2020-02-15 DIAGNOSIS — R07.9 CHEST PAIN, UNSPECIFIED TYPE: ICD-10-CM

## 2020-02-15 DIAGNOSIS — I48.0 PAROXYSMAL ATRIAL FIBRILLATION (H): ICD-10-CM

## 2020-02-15 LAB
ALBUMIN SERPL-MCNC: 4 G/DL (ref 3.4–5)
ALP SERPL-CCNC: 73 U/L (ref 40–150)
ALT SERPL W P-5'-P-CCNC: 52 U/L (ref 0–50)
ANION GAP SERPL CALCULATED.3IONS-SCNC: 7 MMOL/L (ref 3–14)
AST SERPL W P-5'-P-CCNC: 19 U/L (ref 0–45)
BASOPHILS # BLD AUTO: 0 10E9/L (ref 0–0.2)
BASOPHILS NFR BLD AUTO: 0.5 %
BILIRUB SERPL-MCNC: 1.1 MG/DL (ref 0.2–1.3)
BUN SERPL-MCNC: 12 MG/DL (ref 7–30)
CALCIUM SERPL-MCNC: 8.8 MG/DL (ref 8.5–10.1)
CHLORIDE SERPL-SCNC: 109 MMOL/L (ref 94–109)
CO2 SERPL-SCNC: 24 MMOL/L (ref 20–32)
CREAT SERPL-MCNC: 0.74 MG/DL (ref 0.52–1.04)
D DIMER PPP FEU-MCNC: <0.3 UG/ML FEU (ref 0–0.5)
DIFFERENTIAL METHOD BLD: NORMAL
EOSINOPHIL # BLD AUTO: 0.1 10E9/L (ref 0–0.7)
EOSINOPHIL NFR BLD AUTO: 1.7 %
ERYTHROCYTE [DISTWIDTH] IN BLOOD BY AUTOMATED COUNT: 11.7 % (ref 10–15)
GFR SERPL CREATININE-BSD FRML MDRD: >90 ML/MIN/{1.73_M2}
GLUCOSE SERPL-MCNC: 120 MG/DL (ref 70–99)
HCG SERPL QL: NEGATIVE
HCT VFR BLD AUTO: 41 % (ref 35–47)
HGB BLD-MCNC: 14.4 G/DL (ref 11.7–15.7)
IMM GRANULOCYTES # BLD: 0 10E9/L (ref 0–0.4)
IMM GRANULOCYTES NFR BLD: 0.2 %
LYMPHOCYTES # BLD AUTO: 3.1 10E9/L (ref 0.8–5.3)
LYMPHOCYTES NFR BLD AUTO: 38.6 %
MCH RBC QN AUTO: 32.4 PG (ref 26.5–33)
MCHC RBC AUTO-ENTMCNC: 35.1 G/DL (ref 31.5–36.5)
MCV RBC AUTO: 92 FL (ref 78–100)
MONOCYTES # BLD AUTO: 0.3 10E9/L (ref 0–1.3)
MONOCYTES NFR BLD AUTO: 4.1 %
NEUTROPHILS # BLD AUTO: 4.4 10E9/L (ref 1.6–8.3)
NEUTROPHILS NFR BLD AUTO: 54.9 %
NRBC # BLD AUTO: 0 10*3/UL
NRBC BLD AUTO-RTO: 0 /100
PLATELET # BLD AUTO: 237 10E9/L (ref 150–450)
POTASSIUM SERPL-SCNC: 3.8 MMOL/L (ref 3.4–5.3)
PROT SERPL-MCNC: 7.6 G/DL (ref 6.8–8.8)
RBC # BLD AUTO: 4.45 10E12/L (ref 3.8–5.2)
SODIUM SERPL-SCNC: 140 MMOL/L (ref 133–144)
TROPONIN I SERPL-MCNC: <0.015 UG/L (ref 0–0.04)
WBC # BLD AUTO: 8.1 10E9/L (ref 4–11)

## 2020-02-15 PROCEDURE — 84703 CHORIONIC GONADOTROPIN ASSAY: CPT | Performed by: EMERGENCY MEDICINE

## 2020-02-15 PROCEDURE — 99284 EMERGENCY DEPT VISIT MOD MDM: CPT | Mod: 25

## 2020-02-15 PROCEDURE — 80053 COMPREHEN METABOLIC PANEL: CPT | Performed by: EMERGENCY MEDICINE

## 2020-02-15 PROCEDURE — 93010 ELECTROCARDIOGRAM REPORT: CPT | Mod: Z6 | Performed by: EMERGENCY MEDICINE

## 2020-02-15 PROCEDURE — 84484 ASSAY OF TROPONIN QUANT: CPT | Performed by: EMERGENCY MEDICINE

## 2020-02-15 PROCEDURE — 85379 FIBRIN DEGRADATION QUANT: CPT | Performed by: EMERGENCY MEDICINE

## 2020-02-15 PROCEDURE — 93005 ELECTROCARDIOGRAM TRACING: CPT

## 2020-02-15 PROCEDURE — 99284 EMERGENCY DEPT VISIT MOD MDM: CPT | Mod: 25 | Performed by: EMERGENCY MEDICINE

## 2020-02-15 PROCEDURE — 85025 COMPLETE CBC W/AUTO DIFF WBC: CPT | Performed by: EMERGENCY MEDICINE

## 2020-02-15 RX ORDER — METOPROLOL TARTRATE 25 MG/1
25 TABLET, FILM COATED ORAL DAILY
Qty: 30 TABLET | Refills: 0 | Status: SHIPPED | OUTPATIENT
Start: 2020-02-15 | End: 2021-12-01

## 2020-02-15 ASSESSMENT — ENCOUNTER SYMPTOMS
ENDOCRINE NEGATIVE: 1
CONSTITUTIONAL NEGATIVE: 1
EYES NEGATIVE: 1
MUSCULOSKELETAL NEGATIVE: 1
PSYCHIATRIC NEGATIVE: 1
HEMATOLOGIC/LYMPHATIC NEGATIVE: 1
CHEST TIGHTNESS: 1
ALLERGIC/IMMUNOLOGIC NEGATIVE: 1
NEUROLOGICAL NEGATIVE: 1
GASTROINTESTINAL NEGATIVE: 1
SHORTNESS OF BREATH: 1

## 2020-02-15 NOTE — ED PROVIDER NOTES
History     Chief Complaint   Patient presents with     Chest Pain     HPI  Felisha Zapata is a 29 year old female with a history of paroxysmal atrial fibrillation who presented with chest discomfort and a sense of fluttering in her chest.  Patient is currently on birth control.  Patient was last evaluated in cardiology April 2017 after converting spontaneously with an episode of atrial fibrillation.  It was suggested that she take metoprolol 25 mg twice a day and aspirin.  She arrives in the department today reporting she awoke at around 8 AM with a sensation of left-sided chest discomfort that did not radiate and shortness of breath.  She has had fluttering intermittently over the last several weeks to months but did not wake up with any fluttering sensation in her chest.  She reports she never filled her prescription for metoprolol as initially recommended after her brief hospital stay at [facility] in April 2017.  She reports she is been taking a baby aspirin mostly.  She had an IUD placed 3 days earlier.  She is not sexually active.  She reports she smokes about half a pack per day.  She works as a .  She reports she went drinking last night and went to bed around 10 PM.  Because she woke up with a sensation of shortness of breath and chest discomfort she presented alone by car from home for further care.  She is in no distress on arrival in the department    Allergies:  No Known Allergies    Problem List:    Patient Active Problem List    Diagnosis Date Noted     Atrial fibrillation (H) 02/17/2013     Priority: Medium     CARDIOVASCULAR SCREENING; LDL GOAL LESS THAN 160 10/31/2010     Priority: Medium     Surveillance of previously prescribed intrauterine contraceptive device 04/13/2010     Priority: Medium     Mirena IUD place 2/13/2020   Lot # [id number]  Exp: 4/2022       ASCUS with positive high risk HPV cervical 10/10/2007     Priority: Medium     10/1/2007:Pap--LSIL. Rec  colpo.  1/3/2008:Pap--LSIL, + HPV 58. Rec Colpo  2/11/2008:Pap--LSIL. Colpo-SANA 3. Rec LEEP.  4/24/08:LEEP--SANA 3, + endocervical margins.   4/15/09:Pap--NIL. Repeat pap 1 year. Needs yearly paps for 20 years (2028).  1/20/10:pap--NIL. Repeat pap 1 year.  10/27/11 NIL Pap, + HR HPV. (Care Everywhere)  4/4/13:Pap--NIL. Pap in 1 year.  9/5/14:Pap--NIL, +HR HPV type 16. Plan colp  9/19/14: [place] - Negative for dysplasia. Plan cotest in 1 year.    10/1/2015:Pap--NIL, +HR HPV type 16. Plan [place]-  1/6/16: [place] not done. Tracking updated for 6 mo pap.   6/22/16: NIL Pap, + HR HPV 16. Plan colp  8/9/16: [place] ECC - negative. Plan cotest in 1 year.   10/16/18 ASCUS Pap, + HR HPV 16 (neg 18 & other). Plan colp  6/7/19 Lost to follow-up for pap tracking         BMI 30.0-30.9,adult 10/16/2006     Priority: Medium     Tobacco use disorder 05/09/2005     Priority: Medium        Past Medical History:    Past Medical History:   Diagnosis Date     Abnormal Pap smear of cervix 10/16/2018     Cervical high risk HPV (human papillomavirus) test positive 9/2014, 6/22/16, 10/16/18     H/O colposcopy with cervical biopsy 2/2008     H/O colposcopy with cervical biopsy 9/2014     History of colposcopy 8/9/16     Tobacco use disorder        Past Surgical History:    Past Surgical History:   Procedure Laterality Date     ANESTHESIA CARDIOVERSION  2/18/2013    Procedure: ANESTHESIA CARDIOVERSION;  Cardioversion;  Surgeon: Provider, Generic Anesthesia;  Location: [facility] OR     LEEP TX, CERVICAL  4/2008    SANA 3       Family History:    Family History   Problem Relation Age of Onset     Eye Disorder Paternal Grandmother         glaucoma     Hypertension Paternal Grandfather      Connective Tissue Disorder Sister         psoriasis     Alcohol/Drug Mother      Depression Father        Social History:  Marital Status:  Single [1]  Social History     Tobacco Use     Smoking status: Current Every Day Smoker     Packs/day: 0.50     Years: 11.00     Pack  "years: 5.50     Types: Cigarettes     Start date: 1/13/2014     Smokeless tobacco: Never Used   Substance Use Topics     Alcohol use: Yes     Alcohol/week: 0.0 standard drinks     Comment: mixed 4-10 a week     Drug use: No     Comment: ho marijuana use and \"dabbling in coke and meth\" during high school, clean >4 yrs        Medications:    metoprolol tartrate (LOPRESSOR) 25 MG tablet  levonorgestrel (MIRENA) 20 MCG/24HR IUD  levonorgestrel (MIRENA) 20 MCG/24HR IUD          Review of Systems   Constitutional: Negative.    HENT: Negative.    Eyes: Negative.    Respiratory: Positive for chest tightness and shortness of breath.    Cardiovascular: Positive for chest pain.   Gastrointestinal: Negative.    Endocrine: Negative.    Genitourinary: Negative.    Musculoskeletal: Negative.    Skin: Negative.    Allergic/Immunologic: Negative.    Neurological: Negative.    Hematological: Negative.    Psychiatric/Behavioral: Negative.    All other systems reviewed and are negative.      Physical Exam   BP: 127/87  Pulse: 80  Heart Rate: 99  Temp: 98.3  F (36.8  C)  Resp: 16  Weight: 99.8 kg (220 lb)  SpO2: 97 %      Physical Exam  Constitutional:       General: She is not in acute distress.     Appearance: She is not ill-appearing, toxic-appearing or diaphoretic.   HENT:      Head: Normocephalic and atraumatic.   Neck:      Musculoskeletal: Normal range of motion and neck supple.      Thyroid: No thyromegaly.      Vascular: No hepatojugular reflux or JVD.      Trachea: No tracheal deviation.   Cardiovascular:      Rate and Rhythm: Normal rate and regular rhythm.      Heart sounds: Normal heart sounds. Heart sounds not distant. No murmur. No systolic murmur.   Pulmonary:      Effort: Pulmonary effort is normal. No tachypnea, accessory muscle usage or respiratory distress.      Breath sounds: Normal breath sounds. No stridor.   Chest:      Chest wall: No mass, deformity, tenderness, crepitus or edema. There is no dullness to " percussion.   Abdominal:      General: There is no abdominal bruit.      Palpations: There is no fluid wave, hepatomegaly, splenomegaly or mass.      Tenderness: There is no abdominal tenderness. There is no guarding or rebound.   Musculoskeletal:      Right lower leg: She exhibits no tenderness. No edema.      Left lower leg: She exhibits no tenderness. No edema.   Lymphadenopathy:      Cervical: No cervical adenopathy.   Skin:     Capillary Refill: Capillary refill takes less than 2 seconds.      Coloration: Skin is not cyanotic or pale.      Findings: No ecchymosis, erythema or rash.      Nails: There is no clubbing.     Neurological:      General: No focal deficit present.      Mental Status: She is alert. She is disoriented.      Cranial Nerves: No cranial nerve deficit.      Motor: No weakness.   Psychiatric:         Mood and Affect: Mood normal. Mood is not anxious.         Behavior: Behavior normal. Behavior is not agitated.         ED Course        Procedures               EKG Interpretation:      Interpreted by Davion Anderson MD  Time reviewed: 12:09  Symptoms at time of EKG: Chest discomfort and fluttering  Rhythm: normal sinus   Rate: Normal  Axis: Normal  Ectopy: none  Conduction: normal  ST Segments/ T Waves: Non-specific ST-T wave changes  Q Waves: nonspecific  Comparison to prior: When compared with EKG from April 13, 2017, normal sinus rhythm is present subtle T wave changes but no acute ischemia or arrhythmia    Clinical Impression: No acute ischemia or arrhythmia with normal sinus rhythm          Critical Care time:  none               ED medications: none      ED vitals:  Vitals:    02/15/20 1350 02/15/20 1355 02/15/20 1400 02/15/20 1405   BP:       Pulse:       Resp: 19 22 21 23   Temp:       TempSrc:       SpO2: 96% 95% 94% 95%   Weight:         Prior or recent diagnostic imaging and or work-up:  BLAINE with cardioversion (2/18/13)  No left atrial mass or thrombus visualized. Left  ventricular function,   chamber size, wall motion, and wall thickness are normal.The EF is 55-60%.  PatientHeight: 65 in  PatientWeight: 218 lbs  SystolicPressure: 126 mmHg  DiastolicPressure: 81 mmHg  HeartRate: 113 bpm  BSA 2.1 m^2    Procedure  Transesophageal Echocardiogram with color and spectral Doppler performed.    Left Ventricle  Left ventricular function, chamber size, wall motion, and wall thickness are   normal.The EF is 55-60%.    Right Ventricle  Right ventricular function, chamber size, wall motion, and thickness are   normal.    Atria  Both atria appear normal.  No left atrial mass or thrombus visualized.    Mitral Valve  The mitral valve is normal.    Aortic Valve  Aortic valve is normal in structure and function.    Tricuspid Valve  The tricuspid valve is normal.    Pulmonic Valve  The pulmonic valve is normal.    Vessels  The aorta root is normal.    ED labs and imaging:  Results for orders placed or performed during the hospital encounter of 02/15/20 (from the past 24 hour(s))   CBC with platelets, differential   Result Value Ref Range    WBC 8.1 4.0 - 11.0 10e9/L    RBC Count 4.45 3.8 - 5.2 10e12/L    Hemoglobin 14.4 11.7 - 15.7 g/dL    Hematocrit 41.0 35.0 - 47.0 %    MCV 92 78 - 100 fl    MCH 32.4 26.5 - 33.0 pg    MCHC 35.1 31.5 - 36.5 g/dL    RDW 11.7 10.0 - 15.0 %    Platelet Count 237 150 - 450 10e9/L    Diff Method Automated Method     % Neutrophils 54.9 %    % Lymphocytes 38.6 %    % Monocytes 4.1 %    % Eosinophils 1.7 %    % Basophils 0.5 %    % Immature Granulocytes 0.2 %    Nucleated RBCs 0 0 /100    Absolute Neutrophil 4.4 1.6 - 8.3 10e9/L    Absolute Lymphocytes 3.1 0.8 - 5.3 10e9/L    Absolute Monocytes 0.3 0.0 - 1.3 10e9/L    Absolute Eosinophils 0.1 0.0 - 0.7 10e9/L    Absolute Basophils 0.0 0.0 - 0.2 10e9/L    Abs Immature Granulocytes 0.0 0 - 0.4 10e9/L    Absolute Nucleated RBC 0.0    Comprehensive metabolic panel   Result Value Ref Range    Sodium 140 133 - 144 mmol/L     Potassium 3.8 3.4 - 5.3 mmol/L    Chloride 109 94 - 109 mmol/L    Carbon Dioxide 24 20 - 32 mmol/L    Anion Gap 7 3 - 14 mmol/L    Glucose 120 (H) 70 - 99 mg/dL    Urea Nitrogen 12 7 - 30 mg/dL    Creatinine 0.74 0.52 - 1.04 mg/dL    GFR Estimate >90 >60 mL/min/[1.73_m2]    GFR Estimate If Black >90 >60 mL/min/[1.73_m2]    Calcium 8.8 8.5 - 10.1 mg/dL    Bilirubin Total 1.1 0.2 - 1.3 mg/dL    Albumin 4.0 3.4 - 5.0 g/dL    Protein Total 7.6 6.8 - 8.8 g/dL    Alkaline Phosphatase 73 40 - 150 U/L    ALT 52 (H) 0 - 50 U/L    AST 19 0 - 45 U/L   Troponin I   Result Value Ref Range    Troponin I ES <0.015 0.000 - 0.045 ug/L   D dimer quantitative   Result Value Ref Range    D Dimer <0.3 0.0 - 0.50 ug/ml FEU   HCG qualitative pregnancy (blood)   Result Value Ref Range    HCG Qualitative Serum Negative NEG^Negative             Assessments & Plan (with Medical Decision Making)   Clinical impression: 29-year-old female with a history of paroxysmal atrial fibrillation who presented with chest discomfort and and shortness of breath upon awakening from sleep this morning at 8 AM 4 hours before she was examined.  She is discharged home with close outpatient follow-up in cardiology clinic after period of observation.  Patient reported she is had intermittent fluttering in her chest over the last several weeks to months.  She reports she is not taking metoprolol since her visit in hospital course at [facility] in 2017.  She never filled the prescription.  She is a smoker half a pack per day, and recently had an IUD placed.  She is not sexually active.  Patient reports she is feeling much improved on examination the department.  She reports some left-sided chest discomfort and shortness of breath it is not pleuritic in nature.  No exercise intolerance and no dyspnea on exertion.  On my exam she was pleasant in normal sinus rhythm on telemetry with normal cardiac and lung exam.  She was afebrile and her blood pressure was 127/87 on  arrival in triage.      ED course and Plan:  Reviewed her medical records including her brief hospital course at [facility] in April 2017.  Patient is also had prior event monitor.  She also had a transesophageal echocardiogram with cardioversion for rapid atrial fibrillation in February 2013.  On arrival patient is in a normal sinus rhythm.  Her EKG is compared with EKG from April 13, 2017.  We discussed her report of increasing fluttering episodes over the last several weeks to months with underlying history of paroxysmal atrial fibrillation she admits that she has been compliant with taking a baby aspirin.  Most of the time.  We discussed she may benefit from restarting. metoprolol.  With report of chest discomfort and shortness of breath blood work was obtained.  A d-dimer was obtained with IUD in place.    Her work-up in the department revealed no acute abnormality on lab testing specifically negative troponin ,normal d-dimer, negative pregnancy.  She rested comfortably during her ED course of care.  With a normal cardiac and lung exam and low clinical suspicion for pneumothorax or pneumonia no indication for chest x-ray imaging.  Patient is comfortable going home. I encouraged her reestablish care with cardiology with report of intermittent fluttering over the last several weeks to months.  I provided a prescription for metoprolol to use once daily although it was advised that she take 25 mg twice a day in 2017.      We discussed and reviewed reasons to return to the department to be reevaluated patient expressed comfort, understanding, and agreement of plan of care.      Disclaimer: This note consists of symbols derived from keyboarding, dictation and/or voice recognition software. As a result, there may be errors in the script that have gone undetected. Please consider this when interpreting information found in this chart.  I have reviewed the nursing notes.    I have reviewed the findings,  diagnosis, plan and need for follow up with the patient.       New Prescriptions    METOPROLOL TARTRATE (LOPRESSOR) 25 MG TABLET    Take 1 tablet (25 mg) by mouth daily       Final diagnoses:   Chest pain, unspecified type - Sudden onset upon awakening 8 AM this morning   Paroxysmal atrial fibrillation (H) - History of paroxysmal atrial fibrillation.  In normal sinus rhythm during ED course       2/15/2020   [facility] EMERGENCY DEPARTMENT     Davion Anderson MD  02/16/20 0007

## 2020-02-15 NOTE — DISCHARGE INSTRUCTIONS
1) Your evaluation the department today does not suggest a life-threatening process.  You were not captured to be in atrial fibrillation. We have agreed to discharge you home with plan to begin taking metoprolol which had been prescribed during the last hospital course in 2017- which you admit that you never filled.    2) return to be reexamined if you develop new symptoms of concern.    3) Call the cardiology clinic to establish follow-up appointment within 1 month.    4) see handout provided for common and serious side effects with using metoprolol.

## 2020-02-15 NOTE — ED NOTES
Pt reports waking up with chest pain slightly left of central chest. Lasted approx 20 min. Still has slight sensation but no pain. Pt takes 81 asa daily and has hx of occasional afib. Currently nsr. Did have sob with cp. No hx of dvt/pe. Denies calf pain. No nausea/diaphoresis with cp. Not feeling any arrythmia recently

## 2020-02-15 NOTE — ED AVS SNAPSHOT
[facility] Emergency Department  [address] [address]  Phone:  [phone]  Fax:  [phone]                                    Felisha Zapata   MRN: [id number]    Department:  [facility] Emergency Department   Date of Visit:  2/15/2020           After Visit Summary Signature Page    I have received my discharge instructions, and my questions have been answered. I have discussed any challenges I see with this plan with the nurse or doctor.    ..........................................................................................................................................  Patient/Patient Representative Signature      ..........................................................................................................................................  Patient Representative Print Name and Relationship to Patient    ..................................................               ................................................  Date                                   Time    ..........................................................................................................................................  Reviewed by Signature/Title    ...................................................              ..............................................  Date                                               Time          22EPIC Rev 08/18

## 2020-02-15 NOTE — ED TRIAGE NOTES
"Pt here with c/o pain in the chest that she woke up with. Pt has hx of a-fib and states that she couldn't catch her breath and still feels \"funny\" in her chest.  "

## 2020-02-19 LAB
COPATH REPORT: ABNORMAL
PAP: ABNORMAL

## 2020-02-20 NOTE — PROGRESS NOTES
10/1/2007:Pap--LSIL. Rec colpo.  1/3/2008:Pap--LSIL, + HPV 58. Rec Colpo  2/11/2008:Pap--LSIL. Colpo-SANA 3. Rec LEEP.  4/24/08:LEEP--SANA 3, + endocervical margins.   4/15/09:Pap--NIL. Repeat pap 1 year. Needs yearly paps for 20 years (2028).  1/20/10:pap--NIL. Repeat pap 1 year.  10/27/11 NIL Pap, + HR HPV. (Care Everywhere)  4/4/13:Pap--NIL. Pap in 1 year.  9/5/14:Pap--NIL, +HR HPV type 16. Plan colp  9/19/14: [place] - Negative for dysplasia. Plan cotest in 1 year.    10/1/2015:Pap--NIL, +HR HPV type 16. Plan [place]-  1/6/16: [place] not done. Tracking updated for 6 mo pap.   6/22/16: NIL Pap, + HR HPV 16. Plan colp  8/9/16: [place] ECC - negative. Plan cotest in 1 year.   10/16/18 ASCUS Pap, + HR HPV 16 (neg 18 & other). Plan colp  6/7/19 Lost to follow-up for pap tracking  2/13/20 ASCUS pap, + HR HPV 16. Plan: colpo  2/21/20 left msg for pt to return call (lks)  2/25/20 Message left to return call. (toan) Pt notified by phone. (toan)  4/14/20 Per provider, COVID 19 interim plan updated to:  [place] in 3-6 months (from last cotest date) due before 8/13/20. (MRA)

## 2020-03-18 ENCOUNTER — VIRTUAL VISIT (OUTPATIENT)
Dept: FAMILY MEDICINE | Facility: OTHER | Age: 30
End: 2020-03-18

## 2020-03-19 NOTE — PROGRESS NOTES
"Date: 2020 20:44:02  Clinician: Faviola Badillo  Clinician NPI: [id number]  Patient: Felisha Zapata  Patient : 1990  Patient Address: [address] [place] [address]  Patient Phone: [phone]  Visit Protocol: URI  Patient Summary:  Felisha is a 29 year old ( : 1990 ) female who initiated a Visit for COVID-19 (Coronavirus) evaluation and screening. When asked the question \"Please sign me up to receive news, health information and promotions. \", Felisha responded \"No\".    Felisha states her symptoms started 1-2 days ago.   Her symptoms consist of wheezing, a cough, nasal congestion, malaise, a headache, facial pain or pressure, and myalgia. She is experiencing mild difficulty breathing with activities but can speak normally in full sentences.   Symptom details     Nasal secretions: The color of her mucus is clear and white.    Cough: Felisha coughs a few times an hour and her cough is not more bothersome at night. Phlegm comes into her throat when she coughs. She does not believe her cough is caused by post-nasal drip. The color of the phlegm is yellow.     Wheezing: Felisha has not ever been diagnosed with asthma. The wheezing does not interfere with her normal daily activities.    Facial pain or pressure: The facial pain or pressure feels worse when bending over or leaning forward.     Headache: She states the headache is mild (1-3 on a 10 point pain scale).      Felisha denies having sore throat, fever, ear pain, rhinitis, enlarged lymph nodes, chills, and teeth pain. She also denies taking antibiotic medication for the symptoms and having recent facial or sinus surgery in the past 60 days.   Precipitating events  She has not recently been exposed to someone with influenza. Felisha has been in close contact with the following high risk individuals: children under the age of 5.   Pertinent COVID-19 (Coronavirus) information  Felisha has traveled " internationally or to the areas where COVID-19 (Coronavirus) is widespread in the last 14 days before the start of her symptoms. Countries or locations traveled as reported by the patient (free text): [place], [place]   Felisha has not had a close contact with a laboratory-confirmed COVID-19 patient within 14 days of symptom onset. She also has not had a close contact with a suspected COVID-19 patient within 14 days of symptom onset.   Felisha is not a healthcare worker and does not work in a healthcare facility.   Pertinent medical history  Felisha does not get yeast infections when she takes antibiotics.   Felisha does not need a return to work/school note.   Weight: 220 lbs   Felisha smokes or uses smokeless tobacco.   She denies pregnancy and denies breastfeeding. She does not menstruate.   Additional information as reported by the patient (free text): Atrial fibrillation   Weight: 220 lbs    MEDICATIONS: metoprolol tartrate oral, Children's Aspirin oral, ALLERGIES: NKDA  Clinician Response:  Dear Felisha,  Based on the information provided, you have an influenza-like illness. This is an infection that has the same symptoms of the flu, but the specific virus is not known. Lab testing would be required to confirm the flu virus, but this is often not necessary because the treatment will be the same no matter what is causing your symptoms.  Your symptoms should improve gradually over the next week.  Medication information  I am prescribing:       Benzonatate (Tessalon Perles) 100 mg oral capsule. Take 1-2 capsules by mouth 3 times per day as needed for your cough. There are no refills with this prescription.      Ventolin HFA 90 mcg/actuation aerosol inhaler. Inhale 2 puffs every 4-6 hours as needed for 5 days. There are no refills with this prescription.     The CDC recommends treatment for flu only if antiviral medications can be started within 48 hours of the first flu symptoms or if you fall  into one of the high-risk groups that are more likely to get flu complications.  Since you do not meet guidelines for treatment with antiviral medications, antiviral treatment is not recommended for you. Treatment focuses on controlling your symptoms.  Unless you are allergic to the over-the-counter medication(s) below, I recommend using:       Acetaminophen (Tylenol or store brand) oral tablet. Take 1-2 tablets by mouth every 4-6 hours to help with the discomfort.      Ibuprofen (Advil or store brand) 200 mg oral tablet. Take 1-3 tablets (200-600 mg) by mouth every 8 hours to help with the discomfort. Make sure to take the ibuprofen with food. Do not exceed 2400 mg in 24 hours.      Dextromethorphan (Robitussin DM or store brand). This medication is a cough suppressant that works by decreasing the feeling of needing to cough. Please follow the instructions on the package.     Over-the-counter medications do not require a prescription. Ask the pharmacist if you have any questions.  Self care  The following tips will keep you as comfortable as possible while you recover:     Rest    Drink plenty of water and other liquids    Take a hot shower to loosen congestion    Take a spoonful of honey to reduce your cough     If you have a fever, stay home until your temperature has returned to normal for 24 hours and you feel well enough for daily activities. And of course, wash your hands often to prevent spreading the flu and other illnesses. However, the best way to prevent the flu is to get a flu shot before each flu season.  Also, as your provider, I need you to know that becoming tobacco-free is the most important thing you can do to protect your current and future health.  When to seek care  Please be seen in a clinic or urgent care if new symptoms develop, or symptoms become worse.  COVID-19 (Coronavirus) General Information  With the increase in the number of COVID-19 (Coronavirus) cases, we understand you may have  some questions. Below is some helpful information on COVID-19 (Coronavirus).  How can I protect myself and others from the COVID-19 (Coronavirus)?  Because there is currently no vaccine to prevent infection, the best way to protect yourself is to avoid being exposed to this virus. Put distance between yourself and other people if COVID-19 (Coronavirus) is spreading in your community. The virus is thought to spread mainly from person-to-person.     Between people who are in close contact with one another (within about 6 about) for prolonged period (10 minutes or longer).    Through respiratory droplets produced when an infected person coughs or sneezes.     The CDC recommends the following additional steps to protect yourself and others:     Wash your hands often with soap and water for at least 20 seconds, especially after blowing your nose, coughing, or sneezing; going to the bathroom; and before eating or preparing food.  Use an alcohol-based hand  that contains at least 60 percent alcohol if soap and water are not available.        Avoid touching your eyes, nose and mouth with unwashed hands.    Avoid close contact with people who are sick.    Stay home when you are sick.    Cover your cough or sneeze with a tissue, then throw the tissue in the trash.    Clean and disinfect frequently touched objects and surfaces.     You can help stop COVID-19 (Coronavirus) by knowing the signs and symptoms:     Fever    Cough    Shortness of breath     Contact your healthcare provider if   Develop symptoms   AND   Have been in close contact with a person known to have COVID-19 (Coronavirus) or live in or have recently traveled from an area with ongoing spread of COVID-19 (Coronavirus). Call ahead before you go to a doctor's office or emergency room. Tell them about your recent travel and your symptoms.   For the most up to date information, visit the CDC's website.  Steps to help prevent the spread of COVID-19  (Coronavirus) if you are sick  If you are sick with COVID-19 (Coronavirus) or suspect you are infected with the virus that causes COVID-19 (Coronavirus), follow the steps below to help prevent the disease from spreading&nbsp;to people in your home and community.     Stay home except to get medical care. Home isolation may be started in consultation with your healthcare clinician.    Separate yourself from other people and animals in your home.    Call ahead before visiting your doctor if you have a medical appointment.    Wear a facemask when you are around other people.    Cover your cough and sneezes.    Clean your hands often.    Avoid sharing personal household items.    Clean and disinfect frequently touched objects and surfaces everyday.    You will need to have someone drop off medications or household supplies (if needed) at your house without coming inside or in contact with you or others living in your house.    Monitor your symptoms and seek prompt medical care if your illness is worsening (e.g. Difficulty breathing).    Discontinue home isolation only in consultation with your healthcare provider.     For more detailed and up to date information on what to do if you are sick, visit this link: What to Do If You Are Sick With Coronavirus Disease 2019 (COVID-19).  Do I need to be tested for COVID-19 (Coronavirus)?     At this time, the limited number of tests available are controlled by the state and local health departments and are being reserved for more seriously ill patients, those with known exposure to confirmed patients, and those with recent travel (within 14 days) to countries with high rates of COVID-19 (Coronavirus).    Decisions on which patients receive testing will be based on the local spread of COVID-19 (Coronavirus) as well as the symptoms. Your healthcare provider will make the final decision on whether you should be tested.    In the meantime, if you have concerns that you may have been  "exposed, it is reasonable to practice \"social distancing.\"&nbsp; If you are ill with a cold or flu-like illness, please monitor your symptoms and reach out to your healthcare provider if your symptoms worsen.    For more up to date information, visit this link: COVID-19 (Coronavirus) Frequently Asked Questions and Answers.      Diagnosis: Influenza-like illness  Diagnosis ICD: J11.1  Prescription: benzonatate (Tessalon Perles) 100 mg oral capsule 30 capsule, 5 days supply. Take 1-2 capsules by mouth 3 times per day as needed. Refills: 0, Refill as needed: no, Allow substitutions: yes  Prescription: Ventolin HFA 90 mcg/actuation inhalation HFA aerosol inhaler 1 200 inhalation canister, 5 days supply. Inhale 2 puffs every 4-6 hours as needed for 5 days. Refills: 0, Refill as needed: no, Allow substitutions: yes  Pharmacy: [facility] - [phone] - [address] [place] [address]  "

## 2020-04-12 ENCOUNTER — VIRTUAL VISIT (OUTPATIENT)
Dept: FAMILY MEDICINE | Facility: OTHER | Age: 30
End: 2020-04-12

## 2020-04-12 NOTE — PROGRESS NOTES
"Date: 2020 12:54:55  Clinician: Ray Miller  Clinician NPI: [id number]  Patient: Felisha Zapata  Patient : 1990  Patient Address: [address] [place], [place] [id number]  Patient Phone: [phone]  Visit Protocol: URI  Patient Summary:  Felisha is a 29 year old ( : 1990 ) female who initiated a Visit for COVID-19 (Coronavirus) evaluation and screening. When asked the question \"Please sign me up to receive news, health information and promotions. \", Felisha responded \"No\".    Felisha states her symptoms started gradually 3-4 days ago.   Her symptoms consist of facial pain or pressure, myalgia, tooth pain, a headache, wheezing, a cough, nasal congestion, and malaise. She is experiencing mild difficulty breathing with activities but can speak normally in full sentences. Felisha also feels feverish but was unable to measure her temperature.   Symptom details     Nasal secretions: The color of her mucus is green and blood-tinged.    Cough: Felisha coughs a few times an hour and her cough is not more bothersome at night. Phlegm comes into her throat when she coughs. She does not believe her cough is caused by post-nasal drip. The color of the phlegm is yellow and green.     Wheezing: Felisha has not ever been diagnosed with asthma. The wheezing does not interfere with her normal daily activities.    Facial pain or pressure: The facial pain or pressure feels worse when bending over or leaning forward.     Headache: She states the headache is mild (1-3 on a 10 point pain scale).     Tooth pain: The tooth pain is caused by a cavity, recent dental work, or other mouth problems.      Felisha denies having rhinitis, enlarged lymph nodes, chills, diarrhea, vomiting, nausea, sore throat, and ear pain. She also denies taking antibiotic medication for the symptoms, having recent facial or sinus surgery in the past 60 days, having a sinus infection within the past year, and double " sickening (worsening symptoms after initial improvement).   Precipitating events  She has not recently been exposed to someone with influenza. Felisha has been in close contact with the following high risk individuals: children under the age of 5.   Pertinent COVID-19 (Coronavirus) information  Felisha has not traveled internationally or to the areas where COVID-19 (Coronavirus) is widespread, including cruise ship travel in the last 14 days before the start of her symptoms.   Felisha does not work or volunteer as healthcare worker or a  and does not work or volunteer in a healthcare facility.   She lives with a healthcare worker.   Felisha has not had a close contact with a laboratory-confirmed COVID-19 patient within 14 days of symptom onset. She also has not had a close contact with a suspected COVID-19 patient within 14 days of symptom onset.   Pertinent medical history  Felisha typically gets a yeast infection when she takes antibiotics. She has used fluconazole (Diflucan) to treat previous yeast infections. 2 doses of fluconazole (Diflucan) has typically been needed for symptoms to resolve in the past.  Felisha does not need a return to work/school note.   Weight: 220 lbs   Felisha smokes or uses smokeless tobacco.   She denies pregnancy and denies breastfeeding. She does not menstruate.   Additional information as reported by the patient (free text): Sometimes my heart flutters or slows down   Weight: 220 lbs    MEDICATIONS: albuterol sulfate inhalation, metoprolol tartrate oral, Children's Aspirin oral, ALLERGIES: NKDA  Clinician Response:  Dear Felisha,   Based on the information you have provided, further evaluation in urgent care is indicated. You may be seen in one of our designated urgent care sites that is prepared to see patients who have symptoms that could indicate Coronavirus (Covid-19).   For your information: At this time we will NOT perform coronavirus testing in  urgent care sites. This test is currently being reserved for patients who are being admitted to the hospital.  [facility] Locations: [place], [place], [place], [place], [place], [facility], [facility]), [place] and [place]  Please call kiki ([phone]) to schedule an urgent care appointment at one of our urgent care or walk in care sites. It is essential that you tell them when you call that you were referred to be scheduled for in-person urgent care appointment by a [facility].      [facility]: If you usually get care or are located in the [facility] area, you can be seen as a walk in without an appointment at the [facility]. This is open from 8AM-8PM on weekdays and 10am-8pm on weekends. The phone number to this clinic is [phone].     PLEASE BRING DOCUMENTATION FROM THIS COMPLETED [facility] VISIT TO YOUR URGENT CARE VISIT.     For more information about COVID19 and options for caring for yourself at home, please visit the CDC website at https://www.cdc.gov/coronavirus/2019-ncov/about/steps-when-sick.html  For more options for care at [facility], please visit our website at [email or web address]    Diagnosis: Acute upper respiratory infection, unspecified  Diagnosis ICD: J06.9

## 2020-04-14 ENCOUNTER — PATIENT OUTREACH (OUTPATIENT)
Dept: PEDIATRICS | Facility: CLINIC | Age: 30
End: 2020-04-14

## 2020-04-14 NOTE — TELEPHONE ENCOUNTER
Lul Meredith,     Please review and recommend appropriate follow up timeframe, due to recent concerns regarding potential exposure to COVID 19.     30 yo with: 2/13/20 ASCUS pap, + HR HPV 16. Plan: colpo    Would you recommend colposcopy be completed within 3 months, 3-6 months, 6-12 months from date of last cotest, which was done on 2/13/2020? Please see pap history below.     Resource: ASCCP COVID 19 Interim guidelines, https://www.asccp.org/covid-19.    10/1/2007:Pap--LSIL. Rec colpo.  1/3/2008:Pap--LSIL, + HPV 58. Rec Colpo  2/11/2008:Pap--LSIL. Colpo-SANA 3. Rec LEEP.  4/24/08:LEEP--SANA 3, + endocervical margins.   4/15/09:Pap--NIL. Repeat pap 1 year. Needs yearly paps for 20 years (2028).  1/20/10:pap--NIL. Repeat pap 1 year.  10/27/11 NIL Pap, + HR HPV. (Care Everywhere)  4/4/13:Pap--NIL. Pap in 1 year.  9/5/14:Pap--NIL, +HR HPV type 16. Plan colp  9/19/14: [place] - Negative for dysplasia. Plan cotest in 1 year.    10/1/2015:Pap--NIL, +HR HPV type 16. Plan [place]-  1/6/16: [place] not done. Tracking updated for 6 mo pap.   6/22/16: NIL Pap, + HR HPV 16. Plan colp  8/9/16: [place] E[facility] - negative. Plan cotest in 1 year.   10/16/18 ASCUS Pap, + HR HPV 16 (neg 18 & other). Plan colp  6/7/19 Lost to follow-up for pap tracking  2/13/20 ASCUS pap, + HR HPV 16. Plan: colpo    Carmelita Benitez RN  Pap Tracking

## 2020-07-09 ENCOUNTER — VIRTUAL VISIT (OUTPATIENT)
Dept: FAMILY MEDICINE | Facility: OTHER | Age: 30
End: 2020-07-09

## 2020-07-09 NOTE — PROGRESS NOTES
"Date: 2020 01:09:22  Clinician: Carmelita Shields  Clinician NPI: [id number]  Patient: Felisha Zapata  Patient : 1990  Patient Address: [address] [place] [address]  Patient Phone: [phone]  Visit Protocol: URI  Patient Summary:  Felisha is a 30 year old ( : 1990 ) female who initiated a Visit for COVID-19 (Coronavirus) evaluation and screening. When asked the question \"Please sign me up to receive news, health information and promotions. \", Felisha responded \"No\".    Felisha states her symptoms started suddenly 3-4 days ago.   Her symptoms consist of wheezing, nausea, ageusia, rhinitis, malaise, a headache, a sore throat, myalgia, anosmia, facial pain or pressure, a cough, and nasal congestion. She is experiencing difficulty breathing due to nasal congestion but she is not short of breath.   Symptom details     Nasal secretions: The color of her mucus is white, yellow, and clear.    Cough: Felisha coughs a few times an hour and her cough is not more bothersome at night. Phlegm comes into her throat when she coughs. She does not believe her cough is caused by post-nasal drip. The color of the phlegm is white, green, and yellow.     Sore throat: Felisha reports having mild throat pain (1-3 on a 10 point pain scale), does not have exudate on her tonsils, and can swallow liquids. The lymph nodes in her neck are not enlarged. A rash has not appeared on the skin since the sore throat started.     Wheezing: Felisha has not ever been diagnosed with asthma. Additional wheezing details as reported by the patient (free text): I dont wheeze all the time but when I do it's hard for me to clear, like I cant breathe deep enough       Facial pain or pressure: The facial pain or pressure feels worse when bending over or leaning forward.     Headache: She states the headache is mild (1-3 on a 10 point pain scale).      Felisha denies having teeth pain, diarrhea, vomiting, ear " pain, chills, enlarged lymph nodes, and fever. She also denies having recent facial or sinus surgery in the past 60 days, taking antibiotic medication in the past month, having a sinus infection within the past year, and double sickening (worsening symptoms after initial improvement).   Precipitating events  Within the past week, Felisha has not been exposed to someone with strep throat. She has not recently been exposed to someone with influenza. Felisha has been in close contact with the following high risk individuals: children under the age of 5.   Pertinent COVID-19 (Coronavirus) information  In the past 14 days, Felisha has not worked in a congregate living setting.   She does not work or volunteer as healthcare worker or a  and does not work or volunteer in a healthcare facility.   Felisha also has not lived in a congregate living setting in the past 14 days. She lives with a healthcare worker.   Felisha has not had a close contact with a laboratory-confirmed COVID-19 patient within 14 days of symptom onset.   Pertinent medical history  Felisha typically gets a yeast infection when she takes antibiotics. She is not sure if she has used fluconazole (Diflucan) to treat previous yeast infections.   Felisha does not need a return to work/school note.   Weight: 200 lbs   Felisha smokes or uses smokeless tobacco.   She denies pregnancy and denies breastfeeding. She does not menstruate.   Weight: 200 lbs    MEDICATIONS: Children's Aspirin oral, ALLERGIES: NKDA  Clinician Response:  Dear Felisha,  I am sorry you are not feeling well. Your health is our priority. Based on the information you have provided, it is possible that you may have some type of viral infection.  Please read the full treatment plan and see my recommendations below.  Medication information  Because you have a viral infection, antibiotics will not help you get better. Treating a viral infection with antibiotics  could actually make you feel worse.  Self care  Steps you can take to be as comfortable as possible:     Rest.    Drink plenty of fluids.    Take a warm shower to loosen congestion    Use a cool-mist humidifier.    Use throat lozenges.    Suck on frozen items such as popsicles.    Drink hot tea with lemon and honey.    Gargle with warm salt water (1/4 teaspoon of salt per 8 ounce glass of water).    Take a spoonful of honey to reduce your cough.     Also, as your provider, I need you to know that becoming tobacco-free is the most important thing you can do to protect your current and future health.  Additional treatment plan   Your symptoms show that you may have coronavirus (COVID-19). This illness can cause fever, cough and trouble breathing. Many people get a mild case and get better on their own. Some people can get very sick.  Based on the symptoms you have shared, I would like you to be re-checked in 2 to 3 days. Please call your family clinic to set up a video or phone visit.  Will I be tested for COVID-19?  We would like to test you for this virus.   Please call [phone] to schedule your visit. Explain that you were referred by [facility] to have a COVID-19 test. Be ready to share your [facility] visit ID number.   The following will serve as your written order for this COVID Test, ordered by me, for the indication of suspected COVID [Z20.828]: The test will be ordered in [employer], our electronic health record, after you are scheduled. It will show as ordered and authorized by Rolando Willis MD.  Order: COVID-19 (Coronavirus) PCR for SYMPTOMATIC testing from [facility].  1.When it's time for your COVID test:   Stay at least 6 feet away from others. (If someone will drive you to your test, stay in the backseat, as far away from the  as you can.)   Cover your mouth and nose with a mask, tissue or washcloth.  Go straight to the testing site. Don't make any stops on the way there or back.      2.Starting now: Stay home  "and away from others (self-isolate) until:   You've had no fever---and no medicine that reduces fever---for 3 full days (72 hours). And...   Your other symptoms have gotten better. For example, your cough or breathing has improved. And...   At least 10 days have passed since your symptoms started.       During this time, don't leave the house except for testing or medical care.   Stay in your own room, even for meals. Use your own bathroom if you can.   Stay away from others in your home. No hugging, kissing or shaking hands. No visitors.  Don't go to work, school or anywhere else.    Clean \"high touch\" surfaces often (doorknobs, counters, handles, etc.). Use a household cleaning spray or wipes. You'll find a full list of  on the EPA website: www.epa.gov/pesticide-registration/list-n-disinfectants-use-against-sars-cov-2.   Cover your mouth and nose with a mask, tissue or washcloth to avoid spreading germs.  Wash your hands and face often. Use soap and water.  Caregivers in these groups are at risk for severe illness due to COVID-19:  o People 65 years and older  o People who live in a nursing home or long-term care facility  o People with chronic disease (lung, heart, cancer, diabetes, kidney, liver, immunologic)   o People who have a weakened immune system, including those who:   Are in cancer treatment  Take medicine that weakens the immune system, such as corticosteroids  Had a bone marrow or organ transplant  Have an immune deficiency  Have poorly controlled HIV or AIDS  Are obese (body mass index of 40 or higher)  Smoke regularly   o Caregivers should wear gloves while washing dishes, handling laundry and cleaning bedrooms and bathrooms.  o Use caution when washing and drying laundry: Don't shake dirty laundry, and use the warmest water setting that you can.  o For more tips, go to www.cdc.gov/coronavirus/2019-ncov/downloads/10Things.pdf.      How can I take care of myself?   Get lots of rest. Drink " extra fluids (unless a doctor has told you not to)   Take Tylenol (acetaminophen) for fever or pain. If you have liver or kidney problems, ask your family doctor if it's okay to take Tylenol.   Adults can take either:    650 mg (two 325 mg pills) every 4 to 6 hours, or...   1,000 mg (two 500 mg pills) every 8 hours as needed.    Note: Don't take more than 3,000 mg in one day. Acetaminophen is found in many medicines (both prescribed and over-the-counter medicines). Read all labels to be sure you don't take too much.   For children, check the Tylenol bottle for the right dose. The dose is based on the child's age or weight.    If you have other health problems (like cancer, heart failure, an organ transplant or severe kidney disease): Call your specialty clinic if you don't feel better in the next 2 days.       Know when to call 911. Emergency warning signs include:    Trouble breathing or shortness of breath Pain or pressure in the chest that doesn't go away Feeling confused like you haven't felt before, or not being able to wake up Bluish-colored lips or face  Where can I get more information?   [facility] -- About COVID-19: [email or web address]   CDC -- What to Do If You're Sick: www.cdc.gov/coronavirus/2019-ncov/about/steps-when-sick.html   CDC -- Ending Home Isolation: www.cdc.gov/coronavirus/2019-ncov/hcp/disposition-in-home-patients.html   CDC -- Caring for Someone: www.cdc.gov/coronavirus/2019-ncov/if-you-are-sick/care-for-someone.html   [facility] -- Interim Guidance for Hospital Discharge to Home: www.health.[facility].[place]/diseases/coronavirus/hcp/hospdischarge.pdf   [facility] clinical trials (COVID-19 research studies): [email or web address]-trials    Below are the COVID-19 hotlines at the [place] Department of Health ([facility]). Interpreters are available.    For health questions: Call 118-641-4105 or 1-472.740.2798 (7 a.m. to 7 p.m.) For questions about schools and  childcare: Call 231-793-5045 or 1-304.741.1141 (7 a.m. to 7 p.m.)       COVID-19 (Coronavirus) General Information  Because there is currently no vaccine to prevent infection, the best way to protect yourself is to avoid being exposed to this virus. Common symptoms of COVID-19 include but are not limited to fever, cough, and shortness of breath. These symptoms appear 2-14 days after you are exposed to the virus that causes COVID-19. Click here for more information from the CDC on how to protect yourself.  If you are sick with COVID-19 or suspect you are infected with the virus that causes COVID-19, follow the steps here from the CDC to help prevent the disease from spreading to people in your home and community.  Click here for general information from the CDC on testing.  If you develop any of these emergency warning signs for COVID-19, get medical attention immediately:     Trouble breathing    Persistent pain or pressure in the chest    New confusion or inability to arouse    Bluish lips or face      Call your doctor or clinic before going in. Call [phone] if you have a medical emergency and notify the  you have or think you may have COVID-19.  For more detailed and up to date information on COVID-19 (Coronavirus), please visit the CDC website.   Diagnosis: Cough  Diagnosis ICD: R05

## 2020-09-09 ENCOUNTER — PATIENT OUTREACH (OUTPATIENT)
Dept: OBGYN | Facility: CLINIC | Age: 30
End: 2020-09-09

## 2020-09-09 DIAGNOSIS — R87.610 ASCUS WITH POSITIVE HIGH RISK HPV CERVICAL: ICD-10-CM

## 2020-09-09 DIAGNOSIS — R87.810 ASCUS WITH POSITIVE HIGH RISK HPV CERVICAL: ICD-10-CM

## 2020-09-09 NOTE — TELEPHONE ENCOUNTER
Spoke with pt, states she will call to schedule.  Sabine Ellington -   [facility] Pap Tracking

## 2020-11-16 ENCOUNTER — HEALTH MAINTENANCE LETTER (OUTPATIENT)
Age: 30
End: 2020-11-16

## 2021-01-27 ASSESSMENT — ENCOUNTER SYMPTOMS
FREQUENCY: 0
DIARRHEA: 0
NAUSEA: 0
DIZZINESS: 0
HEMATOCHEZIA: 0
WEAKNESS: 0
HEMATURIA: 0
PALPITATIONS: 0
SORE THROAT: 0
CHILLS: 0
PARESTHESIAS: 0
FEVER: 0
JOINT SWELLING: 0
DYSURIA: 0
NERVOUS/ANXIOUS: 0
ABDOMINAL PAIN: 0
HEARTBURN: 0
BREAST MASS: 0
MYALGIAS: 0
EYE PAIN: 0
COUGH: 0
CONSTIPATION: 0
ARTHRALGIAS: 0
HEADACHES: 0

## 2021-02-03 ENCOUNTER — OFFICE VISIT (OUTPATIENT)
Dept: FAMILY MEDICINE | Facility: CLINIC | Age: 31
End: 2021-02-03
Payer: COMMERCIAL

## 2021-02-03 VITALS
BODY MASS INDEX: 34.62 KG/M2 | TEMPERATURE: 98 F | WEIGHT: 220.6 LBS | HEIGHT: 67 IN | SYSTOLIC BLOOD PRESSURE: 130 MMHG | DIASTOLIC BLOOD PRESSURE: 80 MMHG | HEART RATE: 85 BPM | OXYGEN SATURATION: 97 %

## 2021-02-03 DIAGNOSIS — Z00.00 ROUTINE GENERAL MEDICAL EXAMINATION AT A HEALTH CARE FACILITY: Primary | ICD-10-CM

## 2021-02-03 DIAGNOSIS — Z12.4 CERVICAL CANCER SCREENING: ICD-10-CM

## 2021-02-03 DIAGNOSIS — Z30.431 SURVEILLANCE OF PREVIOUSLY PRESCRIBED INTRAUTERINE CONTRACEPTIVE DEVICE: ICD-10-CM

## 2021-02-03 PROCEDURE — G0124 SCREEN C/V THIN LAYER BY MD: HCPCS | Performed by: PATHOLOGY

## 2021-02-03 PROCEDURE — G0145 SCR C/V CYTO,THINLAYER,RESCR: HCPCS | Performed by: FAMILY MEDICINE

## 2021-02-03 PROCEDURE — 99395 PREV VISIT EST AGE 18-39: CPT | Performed by: FAMILY MEDICINE

## 2021-02-03 PROCEDURE — 87624 HPV HI-RISK TYP POOLED RSLT: CPT | Performed by: FAMILY MEDICINE

## 2021-02-03 ASSESSMENT — ENCOUNTER SYMPTOMS
DIZZINESS: 0
NERVOUS/ANXIOUS: 0
HEARTBURN: 0
WEAKNESS: 0
BREAST MASS: 0
COUGH: 0
HEMATURIA: 0
HEMATOCHEZIA: 0
NAUSEA: 0
MYALGIAS: 0
PALPITATIONS: 0
FEVER: 0
CHILLS: 0
FREQUENCY: 0
DYSURIA: 0
HEADACHES: 0
ABDOMINAL PAIN: 0
DIARRHEA: 0
PARESTHESIAS: 0
EYE PAIN: 0
ARTHRALGIAS: 0
JOINT SWELLING: 0
CONSTIPATION: 0
SORE THROAT: 0

## 2021-02-03 ASSESSMENT — MIFFLIN-ST. JEOR: SCORE: 1743.27

## 2021-02-03 NOTE — NURSING NOTE
"Chief Complaint   Patient presents with     Physical       Initial /80   Pulse 85   Temp 98  F (36.7  C) (Tympanic)   Ht 1.702 m (5' 7\")   Wt 100.1 kg (220 lb 9.6 oz)   LMP  (LMP Unknown)   SpO2 97%   Breastfeeding No   BMI 34.55 kg/m   Estimated body mass index is 34.55 kg/m  as calculated from the following:    Height as of this encounter: 1.702 m (5' 7\").    Weight as of this encounter: 100.1 kg (220 lb 9.6 oz).    Patient presents to the clinic using No DME    Health Maintenance that is potentially due pending provider review:  Flu shot    Pt declines to have flu shot.        "

## 2021-02-03 NOTE — PROGRESS NOTES
SUBJECTIVE:   CC: Felisha Zapata is an 30 year old woman who presents for preventive health visit.       Patient has been advised of split billing requirements and indicates understanding: Yes  Healthy Habits:     Getting at least 3 servings of Calcium per day:  Yes    Bi-annual eye exam:  NO    Dental care twice a year:  NO    Sleep apnea or symptoms of sleep apnea:  Daytime drowsiness    Diet:  Regular (no restrictions)    Frequency of exercise:  None    Taking medications regularly:  Yes    Medication side effects:  Not applicable    PHQ-2 Total Score: 0    Additional concerns today:  No      {Outside tests to abstract? :726780}    {additional problems to add (Optional):622503}    Today's PHQ-2 Score:   PHQ-2 ( 1999 Pfizer) 1/27/2021   Q1: Little interest or pleasure in doing things 0   Q2: Feeling down, depressed or hopeless 0   PHQ-2 Score 0   Q1: Little interest or pleasure in doing things Not at all   Q2: Feeling down, depressed or hopeless Not at all   PHQ-2 Score 0       Abuse: Current or Past (Physical, Sexual or Emotional) - No  Do you feel safe in your environment? Yes        Social History     Tobacco Use     Smoking status: Current Every Day Smoker     Packs/day: 0.50     Years: 11.00     Pack years: 5.50     Types: Cigarettes     Start date: 1/13/2014     Smokeless tobacco: Never Used   Substance Use Topics     Alcohol use: Yes     Alcohol/week: 0.0 standard drinks     Comment: mixed 4-10 a week     If you drink alcohol do you typically have >3 drinks per day or >7 drinks per week? Yes      Alcohol Use 1/27/2021   Prescreen: >3 drinks/day or >7 drinks/week? Yes   Prescreen: >3 drinks/day or >7 drinks/week? -   AUDIT SCORE  8     AUDIT - Alcohol Use Disorders Identification Test - Reproduced from the World Health Organization Audit 2001 (Second Edition) 1/27/2021   1.  How often do you have a drink containing alcohol? 2 to 3 times a week   2.  How many drinks containing alcohol do you have on  "a typical day when you are drinking? 5 or 6   3.  How often do you have five or more drinks on one occasion? Weekly   4.  How often during the last year have you found that you were not able to stop drinking once you had started? Never   5.  How often during the last year have you failed to do what was normally expected of you because of drinking? Never   6.  How often during the last year have you needed a first drink in the morning to get yourself going after a heavy drinking session? Never   7.  How often during the last year have you had a feeling of guilt or remorse after drinking? Never   8.  How often during the last year have you been unable to remember what happened the night before because of your drinking? Never   9.  Have you or someone else been injured because of your drinking? No   10. Has a relative, friend, doctor or other health care worker been concerned about your drinking or suggested you cut down? No   TOTAL SCORE 8       Any new diagnosis of family breast, ovarian, or bowel cancer? No {Link to S-7 Assessment :402256}    Reviewed orders with patient.  Reviewed health maintenance and updated orders accordingly - Yes  {Chronicprobdata (optional):363872}    Breast CA Risk Screening:  No flowsheet data found.  {Pull in link for FHS-7 answers if completed after opening note (Optional) :482469}  {If any of the questions to the BCRA (FHS-7) are answered yes, consider ordering referral for genetic counseling (Optional) :298833::\"click delete button to remove this line now\"}  {AMB Mammogram Decision Support :273667}  Pertinent mammograms are reviewed under the imaging tab.    History of abnormal Pap smear: { :377387}  PAP / HPV Latest Ref Rng & Units 2/13/2020 10/16/2018 6/22/2016   PAP - ASC-US(A) ASC-US(A) NIL   HPV 16 DNA NEG:Negative Positive(A) Positive(A) Positive(A)   HPV 18 DNA NEG:Negative Negative Negative Negative   OTHER HR HPV NEG:Negative Negative Negative Negative     Reviewed and " "updated as needed this visit by clinical staff  Tobacco  Allergies  Meds   Med Hx  Surg Hx  Fam Hx  Soc Hx        Reviewed and updated as needed this visit by Provider                {HISTORY OPTIONS (Optional):331567}    Review of Systems   Constitutional: Negative for chills and fever.   HENT: Negative for congestion, ear pain, hearing loss and sore throat.    Eyes: Negative for pain and visual disturbance.   Respiratory: Negative for cough.    Cardiovascular: Negative for chest pain, palpitations and peripheral edema.   Gastrointestinal: Negative for abdominal pain, constipation, diarrhea, heartburn, hematochezia and nausea.   Breasts:  Negative for tenderness, breast mass and discharge.   Genitourinary: Positive for vaginal discharge. Negative for dysuria, frequency, genital sores, hematuria, pelvic pain, urgency and vaginal bleeding.   Musculoskeletal: Negative for arthralgias, joint swelling and myalgias.   Skin: Negative for rash.   Neurological: Negative for dizziness, weakness, headaches and paresthesias.   Psychiatric/Behavioral: Negative for mood changes. The patient is not nervous/anxious.      {FEMALE ROS (Optional):799544}     OBJECTIVE:   /80   Pulse 85   Temp 98  F (36.7  C) (Tympanic)   Ht 1.702 m (5' 7\")   Wt 100.1 kg (220 lb 9.6 oz)   LMP  (LMP Unknown)   SpO2 97%   Breastfeeding No   BMI 34.55 kg/m    Physical Exam  {Exam Choices (Optional):844582}    {Diagnostic Test Results (Optional):702057::\"Diagnostic Test Results:\",\"Labs reviewed in Epic\"}    ASSESSMENT/PLAN:   {Diag Picklist:134851}    Patient has been advised of split billing requirements and indicates understanding: {YES / NO:179108::\"Yes\"}  COUNSELING:  {FEMALE COUNSELING MESSAGES:050157::\"Reviewed preventive health counseling, as reflected in patient instructions\"}    Estimated body mass index is 34.55 kg/m  as calculated from the following:    Height as of this encounter: 1.702 m (5' 7\").    Weight as of this " encounter: 100.1 kg (220 lb 9.6 oz).    {Weight Management Plan (ACO) Complete if BMI is abnormal-  Ages 18-64  BMI >24.9.  Age 65+ with BMI <23 or >30 (Optional):391308}    She reports that she has been smoking cigarettes. She started smoking about 7 years ago. She has a 5.50 pack-year smoking history. She has never used smokeless tobacco.  Tobacco Cessation Action Plan:   {TOBACCO CESSATION ACTION PLAN:213397}      Counseling Resources:  ATP IV Guidelines  Pooled Cohorts Equation Calculator  Breast Cancer Risk Calculator  BRCA-Related Cancer Risk Assessment: FHS-7 Tool  FRAX Risk Assessment  ICSI Preventive Guidelines  Dietary Guidelines for Americans, 2010  USDA's MyPlate  ASA Prophylaxis  Lung CA Screening    Chayo Mccord MD  [facility]

## 2021-02-03 NOTE — PROGRESS NOTES
SUBJECTIVE:   CC: Felisha Zapata is an 30 year old woman who presents for preventive health visit.       Patient has been advised of split billing requirements and indicates understanding: Yes  Healthy Habits:     Getting at least 3 servings of Calcium per day:  Yes    Bi-annual eye exam:  NO    Dental care twice a year:  NO    Sleep apnea or symptoms of sleep apnea:  Daytime drowsiness    Diet:  Regular (no restrictions)    Frequency of exercise:  None    Taking medications regularly:  Yes    Medication side effects:  Not applicable    PHQ-2 Total Score: 0    Additional concerns today:  No                Today's PHQ-2 Score:   PHQ-2 ( 1999 Pfizer) 1/27/2021   Q1: Little interest or pleasure in doing things 0   Q2: Feeling down, depressed or hopeless 0   PHQ-2 Score 0   Q1: Little interest or pleasure in doing things Not at all   Q2: Feeling down, depressed or hopeless Not at all   PHQ-2 Score 0       Abuse: Current or Past (Physical, Sexual or Emotional) - No  Do you feel safe in your environment? Yes        Social History     Tobacco Use     Smoking status: Current Every Day Smoker     Packs/day: 0.50     Years: 11.00     Pack years: 5.50     Types: Cigarettes     Start date: 1/13/2014     Smokeless tobacco: Never Used   Substance Use Topics     Alcohol use: Yes     Alcohol/week: 0.0 standard drinks     Comment: mixed 4-10 a week     If you drink alcohol do you typically have >3 drinks per day or >7 drinks per week? Yes        AUDIT - Alcohol Use Disorders Identification Test - Reproduced from the World Health Organization Audit 2001 (Second Edition) 1/27/2021   1.  How often do you have a drink containing alcohol? 2 to 3 times a week   2.  How many drinks containing alcohol do you have on a typical day when you are drinking? 5 or 6   3.  How often do you have five or more drinks on one occasion? Weekly   4.  How often during the last year have you found that you were not able to stop drinking once you  had started? Never   5.  How often during the last year have you failed to do what was normally expected of you because of drinking? Never   6.  How often during the last year have you needed a first drink in the morning to get yourself going after a heavy drinking session? Never   7.  How often during the last year have you had a feeling of guilt or remorse after drinking? Never   8.  How often during the last year have you been unable to remember what happened the night before because of your drinking? Never   9.  Have you or someone else been injured because of your drinking? No   10. Has a relative, friend, doctor or other health care worker been concerned about your drinking or suggested you cut down? No   TOTAL SCORE 8     Does not see the above as an issue     Any new diagnosis of family breast, ovarian, or bowel cancer? No     Reviewed orders with patient.  Reviewed health maintenance and updated orders accordingly - Yes  Labs reviewed in [employer]    Breast CA Risk Screening:  No flowsheet data found.  No flowsheet data found.      Pertinent mammograms are reviewed under the imaging tab.    History of abnormal Pap smear: yes and did not do colpo due to covid will repeat pap today   PAP / HPV Latest Ref Rng & Units 2/13/2020 10/16/2018 6/22/2016   PAP - ASC-US(A) ASC-US(A) NIL   HPV 16 DNA NEG:Negative Positive(A) Positive(A) Positive(A)   HPV 18 DNA NEG:Negative Negative Negative Negative   OTHER HR HPV NEG:Negative Negative Negative Negative     Reviewed and updated as needed this visit by clinical staff  Tobacco  Allergies  Meds  Problems  Med Hx  Surg Hx  Fam Hx  Soc Hx          Reviewed and updated as needed this visit by Provider  Tobacco  Allergies  Meds  Problems  Med Hx  Surg Hx  Fam Hx         normal cervix, adnexae, and uterus without masses or discharge  Review of Systems   Constitutional: Negative for chills and fever.   HENT: Negative for congestion, ear pain, hearing loss and sore  "throat.    Eyes: Negative for pain and visual disturbance.   Respiratory: Negative for cough.    Cardiovascular: Negative for chest pain, palpitations and peripheral edema.   Gastrointestinal: Negative for abdominal pain, constipation, diarrhea, heartburn, hematochezia and nausea.   Breasts:  Negative for tenderness, breast mass and discharge.   Genitourinary: Positive for vaginal discharge. Negative for dysuria, frequency, genital sores, hematuria, pelvic pain, urgency and vaginal bleeding.   Musculoskeletal: Negative for arthralgias, joint swelling and myalgias.   Skin: Negative for rash.   Neurological: Negative for dizziness, weakness, headaches and paresthesias.   Psychiatric/Behavioral: Negative for mood changes. The patient is not nervous/anxious.           OBJECTIVE:   /80   Pulse 85   Temp 98  F (36.7  C) (Tympanic)   Ht 1.702 m (5' 7\")   Wt 100.1 kg (220 lb 9.6 oz)   LMP  (LMP Unknown)   SpO2 97%   Breastfeeding No   BMI 34.55 kg/m    Physical Exam  GENERAL APPEARANCE: healthy, alert and no distress  NECK: no adenopathy, no asymmetry, masses, or scars and thyroid normal to palpation  RESP: lungs clear to auscultation - no rales, rhonchi or wheezes  CV: regular rates and rhythm, normal S1 S2, no S3 or S4 and no murmur, click or rub  ABDOMEN: soft, nontender, without hepatosplenomegaly or masses and bowel sounds normal   (female): normal cervix, adnexae, and uterus without masses or discharge  SKIN: no suspicious lesions or rashes  PSYCH: mentation appears normal and affect normal/bright    Diagnostic Test Results:  Labs reviewed in Epic    ASSESSMENT/PLAN:   1. Routine general medical examination at a health care facility      2. Cervical cancer screening  History of abnormal   - Pap imaged thin layer screen with HPV - recommended age 30 - 65 years (select HPV order below)    3. Surveillance of previously prescribed intrauterine contraceptive device        Patient has been advised of split " "billing requirements and indicates understanding: Yes  COUNSELING:  Reviewed preventive health counseling, as reflected in patient instructions    Estimated body mass index is 34.55 kg/m  as calculated from the following:    Height as of this encounter: 1.702 m (5' 7\").    Weight as of this encounter: 100.1 kg (220 lb 9.6 oz).    Weight management plan: Discussed healthy diet and exercise guidelines    She reports that she has been smoking cigarettes. She started smoking about 7 years ago. She has a 5.50 pack-year smoking history. She has never used smokeless tobacco.  Tobacco Cessation Action Plan:   Information offered: Patient not interested at this time      Counseling Resources:  ATP IV Guidelines  Pooled Cohorts Equation Calculator  Breast Cancer Risk Calculator  BRCA-Related Cancer Risk Assessment: FHS-7 Tool  FRAX Risk Assessment  ICSI Preventive Guidelines  Dietary Guidelines for Americans, 2010  USDA's MyPlate  ASA Prophylaxis  Lung CA Screening    Chayo Mccord MD  [facility]  "

## 2021-02-08 LAB
COPATH REPORT: ABNORMAL
PAP: ABNORMAL

## 2021-02-11 ENCOUNTER — PATIENT OUTREACH (OUTPATIENT)
Dept: FAMILY MEDICINE | Facility: CLINIC | Age: 31
End: 2021-02-11

## 2021-04-29 NOTE — TELEPHONE ENCOUNTER
2/3/21 ASCUS Pap, + HR HPV 16 (neg 18 & other). [place] due by 5/3/21    
3/30/21 Reminder mychart  4/29/21 [place] not done. Tracking updated for 6 mo colp/pap due 8/3/21.   
Patient due for colp  Reminder mychart letter done today.     
difficulty feeding self/difficulty chewing/difficulty swallowing/dysphagia c PEG

## 2021-07-20 ENCOUNTER — PATIENT OUTREACH (OUTPATIENT)
Dept: FAMILY MEDICINE | Facility: CLINIC | Age: 31
End: 2021-07-20

## 2021-07-20 DIAGNOSIS — R87.610 ASCUS WITH POSITIVE HIGH RISK HPV CERVICAL: ICD-10-CM

## 2021-07-20 DIAGNOSIS — R87.810 ASCUS WITH POSITIVE HIGH RISK HPV CERVICAL: ICD-10-CM

## 2021-08-20 NOTE — TELEPHONE ENCOUNTER
Dr. Mccord,  Patient is lost to pap tracking follow-up. Attempts to contact pt have been made per reminder process and there has been no reply and/or no appt scheduled.    Carmelita Benitez RN  Pap Tracking     10/1/2007:Pap--LSIL. Rec colpo.  1/3/2008:Pap--LSIL, + HPV 58. Rec Colpo  2/11/2008:Pap--LSIL. Colpo-SANA 3. Rec LEEP.  4/24/08:LEEP--SANA 3, + endocervical margins.   4/15/09:Pap--NIL. Repeat pap 1 year. Needs yearly paps for 20 years (2028).  1/20/10:pap--NIL. Repeat pap 1 year.  10/27/11 NIL Pap, + HR HPV. (Care Everywhere)  4/4/13:Pap--NIL. Pap in 1 year.  9/5/14:Pap--NIL, +HR HPV type 16. Plan colp  9/19/14: [facility] - Negative for dysplasia. Plan cotest in 1 year.    10/1/2015:Pap--NIL, +HR HPV type 16. Plan [facility]-  1/6/16: [facility] not done. Tracking updated for 6 mo pap.   6/22/16: NIL Pap, + HR HPV 16. Plan colp  8/9/16: [facility] ECC - negative. Plan cotest in 1 year.   10/16/18 ASCUS Pap, + HR HPV 16 (neg 18 & other). Plan colp  6/7/19 Lost to follow-up for pap tracking  2/13/20 ASCUS pap, + HR HPV 16. Plan: colpo  10/07/20 Patient is lost to pap tracking follow-up.   2/3/21 ASCUS Pap, + HR HPV 16 (neg 18 & other). [facility] due by 5/3/21  2/11/21 Message left to return call. Mychart message sent. Pt read message.  3/30/21 Reminder mychart  4/29/21 [facility] not done. Tracking updated for 6 mo colp/pap due 8/3/21.   7/20/21 Reminder MyChart - read by pt  8/20/21 Lost to follow-up for pap tracking

## 2021-09-18 ENCOUNTER — HEALTH MAINTENANCE LETTER (OUTPATIENT)
Age: 31
End: 2021-09-18

## 2021-12-01 ENCOUNTER — OFFICE VISIT (OUTPATIENT)
Dept: FAMILY MEDICINE | Facility: CLINIC | Age: 31
End: 2021-12-01
Payer: COMMERCIAL

## 2021-12-01 VITALS
WEIGHT: 218.6 LBS | TEMPERATURE: 97.1 F | DIASTOLIC BLOOD PRESSURE: 80 MMHG | BODY MASS INDEX: 34.24 KG/M2 | HEART RATE: 72 BPM | SYSTOLIC BLOOD PRESSURE: 120 MMHG | RESPIRATION RATE: 16 BRPM

## 2021-12-01 DIAGNOSIS — K52.9 CHRONIC DIARRHEA: Primary | ICD-10-CM

## 2021-12-01 DIAGNOSIS — K62.5 RECTAL BLEEDING: ICD-10-CM

## 2021-12-01 DIAGNOSIS — R73.09 ELEVATED GLUCOSE: ICD-10-CM

## 2021-12-01 DIAGNOSIS — R73.09 ELEVATED GLUCOSE: Primary | ICD-10-CM

## 2021-12-01 DIAGNOSIS — I48.0 PAROXYSMAL ATRIAL FIBRILLATION (H): ICD-10-CM

## 2021-12-01 DIAGNOSIS — R79.89 ELEVATED LFTS: ICD-10-CM

## 2021-12-01 LAB
ALBUMIN SERPL-MCNC: 3.7 G/DL (ref 3.4–5)
ALP SERPL-CCNC: 76 U/L (ref 40–150)
ALT SERPL W P-5'-P-CCNC: 90 U/L (ref 0–50)
ANION GAP SERPL CALCULATED.3IONS-SCNC: 6 MMOL/L (ref 3–14)
AST SERPL W P-5'-P-CCNC: 35 U/L (ref 0–45)
BASOPHILS # BLD AUTO: 0 10E3/UL (ref 0–0.2)
BASOPHILS NFR BLD AUTO: 0 %
BILIRUB SERPL-MCNC: 0.8 MG/DL (ref 0.2–1.3)
BUN SERPL-MCNC: 17 MG/DL (ref 7–30)
CALCIUM SERPL-MCNC: 9.1 MG/DL (ref 8.5–10.1)
CHLORIDE BLD-SCNC: 106 MMOL/L (ref 94–109)
CO2 SERPL-SCNC: 25 MMOL/L (ref 20–32)
CREAT SERPL-MCNC: 0.83 MG/DL (ref 0.52–1.04)
EOSINOPHIL # BLD AUTO: 0.2 10E3/UL (ref 0–0.7)
EOSINOPHIL NFR BLD AUTO: 3 %
ERYTHROCYTE [DISTWIDTH] IN BLOOD BY AUTOMATED COUNT: 12.2 % (ref 10–15)
GFR SERPL CREATININE-BSD FRML MDRD: >90 ML/MIN/1.73M2
GLUCOSE BLD-MCNC: 105 MG/DL (ref 70–99)
HBA1C MFR BLD: 4.8 % (ref 0–5.6)
HCT VFR BLD AUTO: 39.3 % (ref 35–47)
HGB BLD-MCNC: 13.8 G/DL (ref 11.7–15.7)
LYMPHOCYTES # BLD AUTO: 2.7 10E3/UL (ref 0.8–5.3)
LYMPHOCYTES NFR BLD AUTO: 44 %
MCH RBC QN AUTO: 32.7 PG (ref 26.5–33)
MCHC RBC AUTO-ENTMCNC: 35.1 G/DL (ref 31.5–36.5)
MCV RBC AUTO: 93 FL (ref 78–100)
MONOCYTES # BLD AUTO: 0.6 10E3/UL (ref 0–1.3)
MONOCYTES NFR BLD AUTO: 10 %
NEUTROPHILS # BLD AUTO: 2.7 10E3/UL (ref 1.6–8.3)
NEUTROPHILS NFR BLD AUTO: 43 %
PLATELET # BLD AUTO: 189 10E3/UL (ref 150–450)
POTASSIUM BLD-SCNC: 3.9 MMOL/L (ref 3.4–5.3)
PROT SERPL-MCNC: 7.3 G/DL (ref 6.8–8.8)
RBC # BLD AUTO: 4.22 10E6/UL (ref 3.8–5.2)
SODIUM SERPL-SCNC: 137 MMOL/L (ref 133–144)
TSH SERPL DL<=0.005 MIU/L-ACNC: 3.93 MU/L (ref 0.4–4)
WBC # BLD AUTO: 6.2 10E3/UL (ref 4–11)

## 2021-12-01 PROCEDURE — 99214 OFFICE O/P EST MOD 30 MIN: CPT | Performed by: NURSE PRACTITIONER

## 2021-12-01 PROCEDURE — 36415 COLL VENOUS BLD VENIPUNCTURE: CPT | Performed by: NURSE PRACTITIONER

## 2021-12-01 PROCEDURE — 83516 IMMUNOASSAY NONANTIBODY: CPT | Mod: 59 | Performed by: NURSE PRACTITIONER

## 2021-12-01 PROCEDURE — 80050 GENERAL HEALTH PANEL: CPT | Performed by: NURSE PRACTITIONER

## 2021-12-01 PROCEDURE — 83036 HEMOGLOBIN GLYCOSYLATED A1C: CPT | Performed by: NURSE PRACTITIONER

## 2021-12-01 ASSESSMENT — PAIN SCALES - GENERAL: PAINLEVEL: MILD PAIN (2)

## 2021-12-01 NOTE — NURSING NOTE
"Chief Complaint   Patient presents with     Rectal Problem     frequesnt diarrhea and had 3 episodes of bright red blood in stool lastweek.     Medication Reconciliation     states stopped taking Metoprolol on her own.       Initial /80 (BP Location: Right arm, Patient Position: Chair, Cuff Size: Adult Large)   Pulse 72   Temp 97.1  F (36.2  C) (Tympanic)   Resp 16   Wt 99.2 kg (218 lb 9.6 oz)   Breastfeeding No   BMI 34.24 kg/m   Estimated body mass index is 34.24 kg/m  as calculated from the following:    Height as of 2/3/21: 1.702 m (5' 7\").    Weight as of this encounter: 99.2 kg (218 lb 9.6 oz).    Patient presents to the clinic using No DME    Health Maintenance that is potentially due pending provider review:  Pap Smear    Pt is already scheduled for pap.    Is there anyone who you would like to be able to receive your results? No  If yes have patient fill out [facility]ESS[facility]  Mary Lou Covarrubias CMA    "

## 2021-12-01 NOTE — PATIENT INSTRUCTIONS
Patient Education     Treating Hemorrhoids: Self-Care  Follow your healthcare provider s advice about caring for your hemorrhoids at home. Some treatments help relieve symptoms right away. Others involve making changes in your diet and exercise habits. These can help ease constipation and prevent hemorrhoids from coming back.   Relieving symptoms  Your healthcare provider may prescribe anti-inflammatory medicine to help ease your symptoms. These tips will also help relieve pain and swelling.     Take sitz baths. Taking a sitz bath means sitting in a few inches of warm bath water. Soaking for 10 minutes twice a day can provide relief from painful hemorrhoids. It can also help the area stay clean.    Develop good bowel habits. Use the bathroom when you need to. Don t ignore the urge to move your bowels. This can lead to constipation, hard stools, and straining. Also, don t read while on the toilet. Sit only as long as needed. Wipe gently with soft, unscented toilet tissue or baby wipes.    Use ice packs. Placing an ice pack on an external hemorrhoid can help relieve pain right away. It will also help reduce the blood clot. Use the ice for 15 to 20 minutes at a time. Keep a cloth between the ice and your skin to prevent skin damage.    Use other measures. Laxatives and enemas can help ease constipation. But use them only on your healthcare provider s advice. For symptom relief, try using cotton pads soaked in witch hazel. These are available at most drugstores. Over-the-counter hemorrhoid ointments and petroleum jelly can also provide relief.  Add fiber to your diet    Adding fiber to your diet can help relieve constipation by making stools softer and easier to pass. To increase your fiber intake, your healthcare provider may recommend a bulking agent, such as psyllium. This is a high-fiber supplement available at most grocery stores and drugstores. Eating more fiber-rich foods will also help. There are two types of  fiber:     Insoluble fiber is the main ingredient in bulking agents. It s also found in foods such as wheat bran, whole-grain breads, fresh fruits, and vegetables.    Soluble fiber is found in foods such as oat bran. Although soluble fiber is good for you, it may not ease constipation as much as foods high in insoluble fiber.  Drink more water  Along with a high-fiber diet, drinking more water can help ease constipation. This is because insoluble fiber absorbs water, making stools soft and bulky. Be sure to drink plenty of water throughout the day. Drinking fruit juices, such as prune juice or apple juice, can also help prevent constipation.   Get more exercise  Regular exercise aids digestion and helps prevent constipation. It s also great for your health. So talk with your healthcare provider about starting an exercise program. Low-impact activities, such as swimming or walking, are good places to start. Take it easy at first. And remember to drink plenty of water when you exercise.   High-fiber foods Easy ways to add fiber  High-fiber foods offer many benefits. By making your stools softer, they help heal and prevent swollen hemorrhoids. They may also help reduce the risk of colon and rectal cancer. Best of all, they re usually low in calories and taste great. Here are some examples of fiber-rich foods.     Whole grains, such as wheat bran, corn bran, and brown rice    Vegetables, especially carrots, broccoli, cabbage, and peas    Fruits, such as apples, bananas, raisins, peaches, prunes, and pears    Nuts and legumes, especially peanuts, lentils, and kidney beans  Easy ways to add fiber  The tips below offer some simple ways to add more high-fiber foods to your meals.     Start your day with a high-fiber breakfast. Eat a wheat bran cereal along with a sliced banana. Or, try peanut butter on whole-wheat toast.    Eat carrot sticks for snacks. They re easy to prepare, taste great, and are low in calories.    Use  whole-grain breads instead of white bread for sandwiches.    Eat fruits for treats. Try an apple and some raisins instead of a candy bar.  [employer] last reviewed this educational content on 6/1/2019 2000-2021 [employer]. All rights reserved. This information is not intended as a substitute for professional medical care. Always follow your healthcare professional's instructions.

## 2021-12-01 NOTE — PROGRESS NOTES
Assessment & Plan     Chronic diarrhea  See GI for further work up  Referral placed.   imodium and fiber recommended.   - Adult Gastro Ref - Consult Only  Labs to look for cause   - Tissue transglutaminase ricky IgA and IgG  - CBC with platelets and differential  - Comprehensive metabolic panel (BMP + Alb, Alk Phos, ALT, AST, Total. Bili, TP)  - TSH with free T4 reflex  - Enteric Bacteria and Virus Panel by [facility] Stool  - Ova and Parasite Exam Routine  - Clostridium difficile Toxin B PCR  - Helicobacter pylori Antigen Stool    Rectal bleeding  Symptomatic care strategies reviewed  Hemorrhoidal care reviewed.  Preparation H and Witch hazel over the counter recommended.   Labs to look for anemia or other cause.   - Adult Gastro Ref - Consult Only  - Tissue transglutaminase ricky IgA and IgG  - CBC with platelets and differential  - Comprehensive metabolic panel (BMP + Alb, Alk Phos, ALT, AST, Total. Bili, TP)  - TSH with free T4 reflex  - Enteric Bacteria and Virus Panel by [facility] Stool  - Ova and Parasite Exam Routine  - Clostridium difficile Toxin B PCR  - Helicobacter pylori Antigen Stool    Paroxysmal atrial fibrillation (H)  Resume ASA recommended.  Follow up with cardiology with ongoing symptoms recommended  Consider hypokalemia as contributory with ongoing diarrhea.     Call or return to the clinic with any worsening of symptoms or no resolution. Patient/Parent verbalized understanding and is in agreement. Medication side effects reviewed.   Current Outpatient Medications   Medication Sig Dispense Refill     levonorgestrel (MIRENA) 20 MCG/24HR IUD 1 each by Intrauterine route once       Chart documentation with Dragon Voice recognition Software. Although reviewed after completion, some words and grammatical errors may remain.    [facility]    Garfield Baeza is a 31 year old who presents for the following health issues          Concern -   Chief  Complaint   Patient presents with     Rectal Problem     frequesnt diarrhea and had 3 episodes of bright red blood in stool lastweek.     Medication Reconciliation     states stopped taking Metoprolol on her own.       Onset: 2021  Description: blood in stool- 3 episodes, frequent diarrhea x 2 years  Bright red blood has had hemorrhoids in the past  Intensity: moderate  Progression of Symptoms:  improving  Accompanying Signs & Symptoms: rectal pain and pain with wiping  Previous history of similar problem: a few years agp  Precipitating factors:        Worsened by: no  Alleviating factors:        Improved by: none  Therapies tried and outcome:  none   Intermittent A fib off and on for years.  No cardiology consult in years  Stopped her metoprolol has not been taking her aspirin    Thanksgi day with blood happened x 2 that day and once the next morning.   Hemorrhoids    IUD for the past 2 years   2020          She eats 2-3 servings of fruits and vegetables daily.She consumes 1 sweetened beverage(s) daily.She exercises with enough effort to increase her heart rate 9 or less minutes per day.  She exercises with enough effort to increase her heart rate 3 or less days per week.   She is taking medications regularly.      Review of Systems   Constitutional, HEENT, cardiovascular, pulmonary, GI, , musculoskeletal, neuro, skin, endocrine and psych systems are negative, except as otherwise noted.      Objective    /80 (BP Location: Right arm, Patient Position: Chair, Cuff Size: Adult Large)   Pulse 72   Temp 97.1  F (36.2  C) (Tympanic)   Resp 16   Wt 99.2 kg (218 lb 9.6 oz)   Breastfeeding No   BMI 34.24 kg/m    Body mass index is 34.24 kg/m .  Physical Exam   GENERAL: healthy, alert and no distress  EYES: Eyes grossly normal to inspection, PERRL and conjunctivae and sclerae normal  HENT: ear canals and TM's normal, nose and mouth without ulcers or lesions  NECK: no adenopathy, no asymmetry,  masses, or scars and thyroid normal to palpation  RESP: lungs clear to auscultation - no rales, rhonchi or wheezes  CV: regular rate and rhythm, normal S1 S2, no S3 or S4, no murmur, click or rub, no peripheral edema and peripheral pulses strong  ABDOMEN: soft, nontender, no hepatosplenomegaly, no masses and bowel sounds normal  RECTAL: normal sphincter tone, no rectal masses and non thrombosed  Hemorrhoid x 3   MS: no gross musculoskeletal defects noted, no edema  SKIN: no suspicious lesions or rashes  NEURO: Normal strength and tone, mentation intact and speech normal  PSYCH: mentation appears normal, affect normal/bright    Office Visit on 02/03/2021   Component Date Value Ref Range Status     PAP 02/03/2021 ASC-US*  Final     Copath Report 02/03/2021    Final                    Value:  Patient Name: BILLIE YOUNG  MR#: [id number]  Specimen #: [id number]  Collected: 2/3/2021  Received: 2/4/2021  Reported: 2/8/2021 15:25  Ordering Phy(s): KRISTA DUENAS    For improved result formatting, select 'View Enhanced Report Format' under   Linked Documents section.    SPECIMEN/STAIN PROCESS:  Pap imaged thin layer prep screening (Surepath, FocalPoint with guided   screening)       Pap-Cyto x 1, HPV ordered x 1    SOURCE: Cervical, endocervical  ----------------------------------------------------------------   Pap imaged thin layer prep screening (Surepath, FocalPoint with guided   screening)  SPECIMEN ADEQUACY:  Satisfactory for evaluation.  -Transformation zone component present.    CYTOLOGIC INTERPRETATION:    Epithelial cell abnormality:  squamous cell:  atypical squamous cells-of   undetermined significance (ASC-US).    Electronically signed out by:    Linda Ruvalcaba M.D.    CLINICAL HISTORY:    Intra-Uterine Device, Previous ASC-US  Date of Last Pap: 02/1                          3/2020,    Papanicolaou Test Limitations:  Cervical cytology is a screening test with   limited sensitivity;  regular  screening is critical for cancer prevention; Pap tests are primarily   effective for the diagnosis/prevention of  squamous cell carcinoma, not adenocarcinomas or other cancers.    COLLECTION SITE:  Client:  [facility]  Location: [facility] ([place])    The technical component of this testing was completed at the [facility], with the professional component performed   at the [facility]  Laboratory, [address] [place] [phone] ([phone])         HPV Source 02/03/2021 SurePath   Final     HPV 16 DNA 02/03/2021 Positive* NEG^Negative Final     HPV 18 DNA 02/03/2021 Negative  NEG^Negative Final     Other HR HPV 02/03/2021 Negative  NEG^Negative Final     Final Diagnosis 02/03/2021 This patient's sample is positive for HPV 16 DNA.   Final    Comment: This test was developed and its performance characteristics determined by the   [facility], Molecular Diagnostics Laboratory. It   has not been cleared or approved by the FDA. The laboratory is regulated under   CLIA as qualified to perform high-complexity testing. This test is used for   clinical purposes. It should not be regarded as investigational or for   research.  (Note)  METHODOLOGY:  The Roche cyndi 4800 system uses automated extraction,   simultaneous amplification of HPV (L1 region) and beta-globin,    followed by  real time detection of fluorescent labeled HPV and beta   globin using specific oligonucleotide probes . The test specifically   identifies types HPV 16 DNA and HPV 18 DNA while concurrently   detecting the rest of the high risk types (31, 33, 35, 39, 45, 51,   52, 56, 58, 59, 66 or 68).  COMMENTS:  This test is not intended for use as a screening device   for women under age 30 with normal cervical cytology.  Results should   be correl                           ated with cytologic and histologic findings. Close  clinical   followup is recommended.       Specimen Description 02/03/2021 Cervical Cells   Final

## 2021-12-02 ENCOUNTER — LAB (OUTPATIENT)
Dept: LAB | Facility: CLINIC | Age: 31
End: 2021-12-02
Payer: COMMERCIAL

## 2021-12-02 DIAGNOSIS — K62.5 RECTAL BLEEDING: ICD-10-CM

## 2021-12-02 DIAGNOSIS — K52.9 CHRONIC DIARRHEA: ICD-10-CM

## 2021-12-02 PROCEDURE — 87177 OVA AND PARASITES SMEARS: CPT

## 2021-12-02 PROCEDURE — 87493 C DIFF AMPLIFIED PROBE: CPT | Mod: 59

## 2021-12-02 PROCEDURE — 87506 IADNA-DNA/RNA PROBE TQ 6-11: CPT

## 2021-12-02 PROCEDURE — 87338 HPYLORI STOOL AG IA: CPT

## 2021-12-02 PROCEDURE — 87209 SMEAR COMPLEX STAIN: CPT

## 2021-12-03 LAB
C COLI+JEJUNI+LARI FUSA STL QL NAA+PROBE: NOT DETECTED
C DIFF TOX B STL QL: NEGATIVE
EC STX1 GENE STL QL NAA+PROBE: NOT DETECTED
EC STX2 GENE STL QL NAA+PROBE: NOT DETECTED
H PYLORI AG STL QL IA: NEGATIVE
NOROV GI+II ORF1-ORF2 JNC STL QL NAA+PR: NOT DETECTED
O+P STL MICRO: NEGATIVE
RVA NSP5 STL QL NAA+PROBE: NOT DETECTED
SALMONELLA SP RPOD STL QL NAA+PROBE: NOT DETECTED
SHIGELLA SP+EIEC IPAH STL QL NAA+PROBE: NOT DETECTED
V CHOL+PARA RFBL+TRKH+TNAA STL QL NAA+PR: NOT DETECTED
Y ENTERO RECN STL QL NAA+PROBE: NOT DETECTED

## 2021-12-06 LAB
TTG IGA SER-ACNC: 0.4 U/ML
TTG IGG SER-ACNC: <0.6 U/ML

## 2021-12-23 ENCOUNTER — OFFICE VISIT (OUTPATIENT)
Dept: OBGYN | Facility: CLINIC | Age: 31
End: 2021-12-23
Payer: COMMERCIAL

## 2021-12-23 VITALS
HEIGHT: 67 IN | RESPIRATION RATE: 16 BRPM | SYSTOLIC BLOOD PRESSURE: 135 MMHG | BODY MASS INDEX: 33.92 KG/M2 | DIASTOLIC BLOOD PRESSURE: 92 MMHG | HEART RATE: 85 BPM | WEIGHT: 216.1 LBS | TEMPERATURE: 96.9 F

## 2021-12-23 DIAGNOSIS — R87.810 ASCUS WITH POSITIVE HIGH RISK HPV CERVICAL: Primary | ICD-10-CM

## 2021-12-23 DIAGNOSIS — R87.610 ASCUS WITH POSITIVE HIGH RISK HPV CERVICAL: Primary | ICD-10-CM

## 2021-12-23 LAB
HCG UR QL: NEGATIVE
INTERNAL QC OK POCT: NORMAL
POCT KIT EXPIRATION DATE: NORMAL
POCT KIT LOT NUMBER: NORMAL

## 2021-12-23 PROCEDURE — 57456 ENDOCERV CURETTAGE W/SCOPE: CPT | Performed by: OBSTETRICS & GYNECOLOGY

## 2021-12-23 PROCEDURE — 88342 IMHCHEM/IMCYTCHM 1ST ANTB: CPT | Mod: 59 | Performed by: PATHOLOGY

## 2021-12-23 PROCEDURE — 81025 URINE PREGNANCY TEST: CPT | Performed by: OBSTETRICS & GYNECOLOGY

## 2021-12-23 PROCEDURE — 88360 TUMOR IMMUNOHISTOCHEM/MANUAL: CPT | Performed by: PATHOLOGY

## 2021-12-23 PROCEDURE — 88305 TISSUE EXAM BY PATHOLOGIST: CPT | Performed by: PATHOLOGY

## 2021-12-23 ASSESSMENT — MIFFLIN-ST. JEOR: SCORE: 1717.85

## 2021-12-23 NOTE — NURSING NOTE
"Initial BP (!) 135/92 (BP Location: Right arm, Patient Position: Sitting, Cuff Size: Adult Regular)   Pulse 85   Temp 96.9  F (36.1  C) (Tympanic)   Resp 16   Ht 1.702 m (5' 7\")   Wt 98 kg (216 lb 1.6 oz)   BMI 33.85 kg/m   Estimated body mass index is 33.85 kg/m  as calculated from the following:    Height as of this encounter: 1.702 m (5' 7\").    Weight as of this encounter: 98 kg (216 lb 1.6 oz). .    Ashlie Freeman LPN  "

## 2021-12-23 NOTE — PROGRESS NOTES
Felisha presents for colposcopy.    Pap hx:  10/1/2007:Pap--LSIL. Rec colpo.  1/3/2008:Pap--LSIL, + HPV 58. Rec Colpo  2/11/2008:Pap--LSIL. Colpo-SANA 3. Rec LEEP.  4/24/08:LEEP--SANA 3, + endocervical margins.   4/15/09:Pap--NIL. Repeat pap 1 year. Needs yearly paps for 20 years (2028).  1/20/10:pap--NIL. Repeat pap 1 year.  10/27/11 NIL Pap, + HR HPV. (Care Everywhere)  4/4/13:Pap--NIL. Pap in 1 year.  9/5/14:Pap--NIL, +HR HPV type 16. Plan colp  9/19/14: [place] - Negative for dysplasia. Plan cotest in 1 year.    10/1/2015:Pap--NIL, +HR HPV type 16. Plan [place]-  1/6/16: [place] not done. Tracking updated for 6 mo pap.   6/22/16: NIL Pap, + HR HPV 16. Plan colp  8/9/16: [place] ECC - negative. Plan cotest in 1 year.   10/16/18 [facility] Pap, + HR HPV 16 (neg 18 & other). Plan colp  6/7/19 Lost to follow-up for pap tracking  2/13/20 [facility] pap, + HR HPV 16. Plan: colpo  10/07/20 Patient is lost to pap tracking follow-up.   2/3/21 [facility] Pap, + HR HPV 16 (neg 18 & other).     PMH/PSH/Meds/Allergies reviewed & documented in [facility]  Procedure for colposcopy and biopsy has been explained to the   patient and consent obtained.    PROCEDURE:  Speculum placed in vagina and excellent visualization of cervix achieved, cervix swabbed x 3 with acetic acid solution.    FINDINGS:  Cervix: no visible lesions; endocervical speculum placed, SCJ visualized 360 degrees without lesions, endocervical curettage performed, specimen labelled and sent to pathology .  IUD strings visible and appropriate. .Nabothian cyst present at the 8 o'clock position    Vaginal inspection: normal without visible lesions.    Vulvar colposcopy: vulvar colposcopy not performed.    Procedure Summary: Patient tolerated procedure well and colposcopy adequate.    Colposcopic Impression: benign    Plan: Specimens labelled and sent to pathology., Will base further treatment on pathology findings. and treatment options discussed with patient    We also discussed the  consideration for completing her HPV vaccination series to boost her immune response.  Congratulated patient on her smoking cessation.  Patient will check with her insurance to ensure that for HPV vaccination.  I think this would be medically relevant and necessary to boost her immune response.     Chacha Meredith M.D.     done

## 2021-12-28 LAB
PATH REPORT.COMMENTS IMP SPEC: NORMAL
PATH REPORT.FINAL DX SPEC: NORMAL
PATH REPORT.GROSS SPEC: NORMAL
PATH REPORT.MICROSCOPIC SPEC OTHER STN: NORMAL
PATH REPORT.RELEVANT HX SPEC: NORMAL
PHOTO IMAGE: NORMAL

## 2021-12-29 ENCOUNTER — PATIENT OUTREACH (OUTPATIENT)
Dept: OBGYN | Facility: CLINIC | Age: 31
End: 2021-12-29
Payer: COMMERCIAL

## 2022-02-10 ENCOUNTER — OFFICE VISIT (OUTPATIENT)
Dept: OBGYN | Facility: CLINIC | Age: 32
End: 2022-02-10
Payer: COMMERCIAL

## 2022-02-10 VITALS
BODY MASS INDEX: 34.37 KG/M2 | RESPIRATION RATE: 16 BRPM | SYSTOLIC BLOOD PRESSURE: 122 MMHG | DIASTOLIC BLOOD PRESSURE: 86 MMHG | HEART RATE: 88 BPM | TEMPERATURE: 97.2 F | HEIGHT: 67 IN | WEIGHT: 219 LBS

## 2022-02-10 DIAGNOSIS — Z23 NEED FOR TDAP VACCINATION: ICD-10-CM

## 2022-02-10 DIAGNOSIS — Z30.433 ENCOUNTER FOR REMOVAL AND REINSERTION OF INTRAUTERINE CONTRACEPTIVE DEVICE: ICD-10-CM

## 2022-02-10 DIAGNOSIS — D06.9 SEVERE DYSPLASIA OF CERVIX (CIN III): Primary | ICD-10-CM

## 2022-02-10 DIAGNOSIS — Z23 NEED FOR HPV VACCINATION: ICD-10-CM

## 2022-02-10 PROBLEM — Z30.430 ENCOUNTER FOR INSERTION OF MIRENA IUD: Status: ACTIVE | Noted: 2022-02-10

## 2022-02-10 PROCEDURE — 58301 REMOVE INTRAUTERINE DEVICE: CPT | Performed by: OBSTETRICS & GYNECOLOGY

## 2022-02-10 PROCEDURE — 90715 TDAP VACCINE 7 YRS/> IM: CPT | Performed by: OBSTETRICS & GYNECOLOGY

## 2022-02-10 PROCEDURE — 81025 URINE PREGNANCY TEST: CPT | Performed by: OBSTETRICS & GYNECOLOGY

## 2022-02-10 PROCEDURE — 57522 CONIZATION OF CERVIX: CPT | Performed by: OBSTETRICS & GYNECOLOGY

## 2022-02-10 PROCEDURE — 90651 9VHPV VACCINE 2/3 DOSE IM: CPT | Performed by: OBSTETRICS & GYNECOLOGY

## 2022-02-10 PROCEDURE — 88305 TISSUE EXAM BY PATHOLOGIST: CPT | Performed by: PATHOLOGY

## 2022-02-10 PROCEDURE — 90472 IMMUNIZATION ADMIN EACH ADD: CPT | Performed by: OBSTETRICS & GYNECOLOGY

## 2022-02-10 PROCEDURE — 90471 IMMUNIZATION ADMIN: CPT | Performed by: OBSTETRICS & GYNECOLOGY

## 2022-02-10 PROCEDURE — 58300 INSERT INTRAUTERINE DEVICE: CPT | Performed by: OBSTETRICS & GYNECOLOGY

## 2022-02-10 PROCEDURE — 88307 TISSUE EXAM BY PATHOLOGIST: CPT | Performed by: PATHOLOGY

## 2022-02-10 ASSESSMENT — MIFFLIN-ST. JEOR: SCORE: 1731.01

## 2022-02-10 NOTE — NURSING NOTE
"Initial /86 (BP Location: Left arm, Patient Position: Chair, Cuff Size: Adult Large)   Pulse 88   Temp 97.2  F (36.2  C) (Tympanic)   Resp 16   Ht 1.702 m (5' 7\")   Wt 99.3 kg (219 lb)   Breastfeeding No   BMI 34.30 kg/m   Estimated body mass index is 34.3 kg/m  as calculated from the following:    Height as of this encounter: 1.702 m (5' 7\").    Weight as of this encounter: 99.3 kg (219 lb). .    Rena Delgado CMA    "

## 2022-02-10 NOTE — PROGRESS NOTES
Prior to immunization administration, verified patients identity using patient s name and date of birth. Please see Immunization Activity for additional information.     Screening Questionnaire for Adult Immunization    Are you sick today?   No   Do you have allergies to medications, food, a vaccine component or latex?   No   Have you ever had a serious reaction after receiving a vaccination?   No   Do you have a long-term health problem with heart, lung, kidney, or metabolic disease (e.g., diabetes), asthma, a blood disorder, no spleen, complement component deficiency, a cochlear implant, or a spinal fluid leak?  Are you on long-term aspirin therapy?   No   Do you have cancer, leukemia, HIV/AIDS, or any other immune system problem?   No   Do you have a parent, brother, or sister with an immune system problem?   No   In the past 3 months, have you taken medications that affect  your immune system, such as prednisone, other steroids, or anticancer drugs; drugs for the treatment of rheumatoid arthritis, Crohn s disease, or psoriasis; or have you had radiation treatments?   No   Have you had a seizure, or a brain or other nervous system problem?   No   During the past year, have you received a transfusion of blood or blood    products, or been given immune (gamma) globulin or antiviral drug?   No   For women: Are you pregnant or is there a chance you could become       pregnant during the next month?   No   Have you received any vaccinations in the past 4 weeks?   No     Immunization questionnaire answers were all negative.        Per orders of Dr. Meredith, injection of TDAP / HPV9 given by Rena Delgado. Patient instructed to remain in clinic for 15 minutes afterwards, and to report any adverse reaction to me immediately.       Screening performed by Rena Delgado on 2/10/2022 at 10:54 AM.

## 2022-02-10 NOTE — PROGRESS NOTES
Felisha Zapata is a 31 year old  who presents today  for LEEP (Loop Electrical Excision Procedure) due to SANA 2-3 on biopsy done 21.  The LEEP procedure was explained. Discussed possible risks including cervical incompetence, infection, bleeding, discomfort, and possible incomplete excision of the abnormal tissue so close follow up was necessary. Consent was obtained and all questions were answered.    Patient currently has IUD placed .  Reviewed risks of cutting strings versus difficulty with IUD removal if they are cut.  Patient had a difficult removal in the past and would like to avoid having another difficult removal.  She is amenable to removal/replacement with the LEEP so that the strings are not compromised    Additionally, the patient had Gardasil x 1 in the past.  We reviewed newer ACOG and ASCCP guidance regarding vaccinations up to age 45 now, especially in high risk patients.  Since this is her 2nd LEEP, she is amenable to receiving her additional doses of HPV vaccination with the additional hope that this helps clear her of the virus.     Urine Pregnancy Test: negative     Procedure: Patient was placed in the lithotomy position and the grounding pad was placed on her leg. A coated speculum was used to position the cervix in the midline position.  The existing IUD strings were grasped and the IUD removed intact without complication.  Under colposcopic visualization, the cervix was cleansed with acetic acid to define the area of abnormality.  Using 1% lidocaine with epinephrine, intracervical injections were performed circumferentially.  A total of 8 cc was used.  After adequate anesthesia, a 10 mm loop electrode was used to excise the abnormal portion of the cervix.  An ECC was performed after excision of the transformation zone.  The 5 mm ball cautery was used to fulgurate the cut surface.  The cervix was re-prepped with betadine and the uterus sounded to 7 cm.  The Mirena IUD was  prepped for placement and the IUD inserted according to 's instructions without difficulty or significant ressitance, and deployed at the fundus. The strings were visualized and trimmed to 2.0 cm from the external os.  With bleeding fairly well controlled, Monsels solution was applied to ensure hemostasis.  The patient tolerated the procedures well.    Mirena  LOT# [id number]  Exp: 04/2024  NDC# 46410-373-96    A:  LEEP for SANA 2-3 on her ECC    P:   Post-LEEP instructions were given to the patient.  She was told to expect heavy discharge for the first 4-7 days and could continue for 2 weeks. Nothing in the vagina and no intercourse for 4-6 weeks.  No heavy lifting for next few days.  Return to clinic if any signs of infection.  Follow up for pap and HPV in 12 months.    Chacha Meredith M.D.

## 2022-02-14 LAB
PATH REPORT.COMMENTS IMP SPEC: NORMAL
PATH REPORT.FINAL DX SPEC: NORMAL
PATH REPORT.FINAL DX SPEC: NORMAL
PATH REPORT.GROSS SPEC: NORMAL
PATH REPORT.GROSS SPEC: NORMAL
PATH REPORT.MICROSCOPIC SPEC OTHER STN: NORMAL
PATH REPORT.MICROSCOPIC SPEC OTHER STN: NORMAL
PATH REPORT.RELEVANT HX SPEC: NORMAL
PATH REPORT.RELEVANT HX SPEC: NORMAL
PHOTO IMAGE: NORMAL
PHOTO IMAGE: NORMAL

## 2022-02-16 ENCOUNTER — PATIENT OUTREACH (OUTPATIENT)
Dept: OBGYN | Facility: CLINIC | Age: 32
End: 2022-02-16
Payer: COMMERCIAL

## 2022-02-16 NOTE — TELEPHONE ENCOUNTER
2/10/22 LEEP SANA 2-3, extending to margins, ECC minute fragment suspicious for HSIL. Plan: cotest in 6 months

## 2022-04-24 ENCOUNTER — HEALTH MAINTENANCE LETTER (OUTPATIENT)
Age: 32
End: 2022-04-24

## 2022-07-21 ENCOUNTER — PATIENT OUTREACH (OUTPATIENT)
Dept: FAMILY MEDICINE | Facility: CLINIC | Age: 32
End: 2022-07-21

## 2022-07-21 DIAGNOSIS — D06.9 SEVERE DYSPLASIA OF CERVIX (CIN III): ICD-10-CM

## 2022-11-19 ENCOUNTER — HEALTH MAINTENANCE LETTER (OUTPATIENT)
Age: 32
End: 2022-11-19

## 2023-01-15 ENCOUNTER — APPOINTMENT (OUTPATIENT)
Dept: GENERAL RADIOLOGY | Facility: CLINIC | Age: 33
End: 2023-01-15
Attending: EMERGENCY MEDICINE
Payer: COMMERCIAL

## 2023-01-15 ENCOUNTER — HOSPITAL ENCOUNTER (EMERGENCY)
Facility: CLINIC | Age: 33
Discharge: HOME | End: 2023-01-15
Attending: EMERGENCY MEDICINE | Admitting: EMERGENCY MEDICINE
Payer: COMMERCIAL

## 2023-01-15 VITALS
RESPIRATION RATE: 14 BRPM | SYSTOLIC BLOOD PRESSURE: 154 MMHG | BODY MASS INDEX: 34.53 KG/M2 | WEIGHT: 220 LBS | HEART RATE: 70 BPM | DIASTOLIC BLOOD PRESSURE: 105 MMHG | HEIGHT: 67 IN | TEMPERATURE: 98.5 F | OXYGEN SATURATION: 98 %

## 2023-01-15 DIAGNOSIS — S86.012A PARTIAL ACHILLES TENDON TEAR, LEFT, INITIAL ENCOUNTER: ICD-10-CM

## 2023-01-15 DIAGNOSIS — M25.571 PAIN IN JOINT, ANKLE AND FOOT, RIGHT: ICD-10-CM
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
## 2023-01-15 PROCEDURE — 99283 EMERGENCY DEPT VISIT LOW MDM: CPT | Performed by: EMERGENCY MEDICINE

## 2023-01-15 PROCEDURE — 73610 X-RAY EXAM OF ANKLE: CPT | Mod: RT

## 2023-01-15 PROCEDURE — 99283 EMERGENCY DEPT VISIT LOW MDM: CPT

## 2023-01-15 ASSESSMENT — ENCOUNTER SYMPTOMS
FEVER: 0
ABDOMINAL PAIN: 0
SHORTNESS OF BREATH: 0

## 2023-01-15 ASSESSMENT — ACTIVITIES OF DAILY LIVING (ADL): ADLS_ACUITY_SCORE: 35

## 2023-01-16 NOTE — ED PROVIDER NOTES
History     Chief Complaint   Patient presents with     Ankle Pain     Right ankle pain and left shoulder pain.      Shoulder Pain     HPI  Felisha Zapata is a 32 year old female who presents to the emergency department with concerns regarding right ankle pain, in addition to left shoulder pain.  Patient had been out drinking alcohol yesterday evening.  The patient's sister found her sitting on the bathroom floor early this morning, crosslegged, and helped the patient to the bed.  Patient did end up in bed waking up this afternoon, and as she attempted to get out of bed had immediate pain of the right ankle.  Patient is unaware of any specific fall or other injury.  However, patient does not recall all of the events from yesterday evening.  She also has mild left shoulder achiness.  No specific pains of the left shoulder.  No numbness, or tingling.  No numbness, or tingling of the right foot.  No prior surgeries of the left upper extremity, or right lower extremity    Allergies:  No Known Allergies    Problem List:    Patient Active Problem List    Diagnosis Date Noted     mirena IUD 2/10/2022 02/10/2022     Priority: Medium     Mirena IUD placed 2/10/2022  LOT# [id number]  Exp: 04/2024  NDC# 06996-345-84    Rena Delgado on 2/10/2022 at 11:33 AM           Atrial fibrillation (H) 02/17/2013     Priority: Medium     CARDIOVASCULAR SCREENING; LDL GOAL LESS THAN 160 10/31/2010     Priority: Medium     Severe dysplasia of cervix (SANA III) 10/10/2007     Priority: Medium     10/1/07 LSIL. Rec colpo.  1/3/08 LSIL, + HPV 58. Rec Colpo  2/11/08 LSIL. Colpo-SANA 3. Rec LEEP.  4/24/08 LEEP SANA 3, + endocervical margins.   4/15/09 NIL. Repeat pap 1 year. Needs yearly paps for 20 years (2028).  1/20/10 NIL. Repeat pap 1 year.  10/27/11 NIL Pap, + HR HPV. (Care Everywhere)  4/4/13 NIL. Pap in 1 year.  9/5/14 NIL, +HR HPV type 16. Plan colp  9/19/14 [place] - Negative for dysplasia. Plan cotest in 1 year.    10/1/2015 NIL,  +HR HPV type 16. Plan [place]-  1/6/16: [place] not done. Tracking updated for 6 mo pap.   6/22/16 NIL Pap, + HR HPV 16. Plan colp  8/9/16 [place] ECC - negative. Plan cotest in 1 year.   10/16/18 ASCUS Pap, + HR HPV 16 (neg 18 & other). Plan colp  6/7/19 Lost to follow-up for pap tracking  2/13/20 ASCUS pap, + HR HPV 16. Plan: colpo  10/07/20 Patient is lost to pap tracking follow-up.   2/3/21 ASCUS Pap, + HR HPV 16 (neg 18 & other). [place] due by 5/3/21  8/20/21 Lost to follow-up for pap tracking  12/23/21 [place] ECC - SAAN 2-3. LEEP due by 3/23/2022.  2/10/22 LEEP SANA 2-3, extending to margins, ECC minute fragment suspicious for HSIL. Plan: cotest in 6 months  2/17/22 Mychart results sent / mychart read  7/21/22 Reminder mychart  9/21/22 Appt-No show  10/24/22 Reminder call. Left msg  11/22/22no appt .  will update to cotest in 1 year           BMI 30.0-30.9,adult 10/16/2006     Priority: Medium     Tobacco use disorder 05/09/2005     Priority: Medium        Past Medical History:    Past Medical History:   Diagnosis Date     Abnormal Pap smear of cervix 2007     Cervical high risk HPV (human papillomavirus) test positive 2008     H/O colposcopy with cervical biopsy 02/2008     H/O colposcopy with cervical biopsy 09/2014     History of colposcopy 08/09/2016     IUD (intrauterine device) in place 2020     Tobacco use disorder        Past Surgical History:    Past Surgical History:   Procedure Laterality Date     ANESTHESIA CARDIOVERSION  2/18/2013    Procedure: ANESTHESIA CARDIOVERSION;  Cardioversion;  Surgeon: Provider, Generic Anesthesia;  Location: [facility] OR     LEEP TX, CERVICAL  4/2008    SANA 3       Family History:    Family History   Problem Relation Age of Onset     Eye Disorder Paternal Grandmother         glaucoma     Hypertension Paternal Grandfather      Connective Tissue Disorder Sister         psoriasis     Alcohol/Drug Mother      Depression Father        Social History:  Marital Status:  Single [1]  Social History  "    Tobacco Use     Smoking status: Former     Packs/day: 0.50     Years: 11.00     Pack years: 5.50     Types: Cigarettes     Start date: 2014     Quit date: 2021     Years since quittin.1     Smokeless tobacco: Never     Tobacco comments:     quit on 2021   Vaping Use     Vaping Use: Never used   Substance Use Topics     Alcohol use: Yes     Alcohol/week: 0.0 standard drinks     Comment: mixed 4-10 a week     Drug use: No     Comment: ho marijuana use and \"dabbling in coke and meth\" during high school, clean >4 yrs        Medications:    levonorgestrel (MIRENA) 20 MCG/24HR IUD  levonorgestrel (MIRENA) 20 MCG/24HR IUD          Review of Systems   Constitutional: Negative for fever.   Respiratory: Negative for shortness of breath.    Cardiovascular: Negative for chest pain.   Gastrointestinal: Negative for abdominal pain.   Musculoskeletal:        See HPI   All other systems reviewed and are negative.      Physical Exam   BP: (!) 154/105  Pulse: 70  Temp: 98.5  F (36.9  C)  Resp: 16  Height: 170.2 cm (5' 7\")  Weight: 99.8 kg (220 lb)  SpO2: 98 %      Physical Exam  BP (!) 154/105   Pulse 70   Temp 98.5  F (36.9  C) (Oral)   Resp 16   Ht 1.702 m (5' 7\")   Wt 99.8 kg (220 lb)   SpO2 98%   BMI 34.46 kg/m    General: alert and in no acute distress  Head: atraumatic, normocephalic  Abd: nondistended  Musculoskel/Extremities: Some sharp, stabbing pains with dorsiflexion of the foot, where patient experiences sharp stabbing pains of the Achilles.  There is no tenderness to palpation of the metatarsal region.  Very minimal tenderness of the lateral and medial malleoli in the posterior aspects.  Normal DP and PT pulses.  Skin: no rashes, no diaphoresis and skin color normal  Neuro: Patient awake, alert, oriented, speech is fluent,    Psychiatric: affect/mood normal, cooperative, normal judgement/insight and memory intact      ED Course                 Procedures              Critical Care time:  " none               Results for orders placed or performed during the hospital encounter of 01/15/23 (from the past 24 hour(s))   Ankle XR, G/E 3 views, right    Narrative    EXAM: XR ANKLE RIGHT G/E 3 VIEWS  LOCATION: [facility]  DATE/TIME: 1/15/2023 10:30 PM    INDICATION: fall.  ankle and achilles pain  COMPARISON: Foot radiograph 07/24/2015      Impression    IMPRESSION: Normal joint spaces and alignment. No fracture. Some edema in Kager's fat pad which is nonspecific but could be seen with partial Achilles tear.       Medications - No data to display    Assessments & Plan (with Medical Decision Making)  32 year old female presenting the emergency department with concerns regarding right ankle, and left shoulder pain.  Patient perhaps may have had a fall during the overnight hours.  Had been drinking alcohol.  Attempted to get out of bed this afternoon, however had immediate pain of the right ankle.  Has been able to ambulate, however with some difficulty secondary to the pain.  No injury elsewhere.  Left shoulder is not tender to palpation and she has full range of motion, and therefore no indication for imaging of the left shoulder.  X-ray images personally reviewed in addition to radiology interpretation of the right ankle.  No evidence of acute fracture.  Perhaps has Achilles type injury, however definitely not a complete rupture as patient does have some dorsiflexion and plantarflexion which is present.    Patient will be discharged home, Tylenol, and ibuprofen as needed for pain, weightbearing as tolerated.  Walking boot given.     I have reviewed the nursing notes.    I have reviewed the findings, diagnosis, plan and need for follow up with the patient.             New Prescriptions    No medications on file       Final diagnoses:   Partial Achilles tendon tear, left, initial encounter   Fall, initial encounter   Pain in joint, ankle and foot, right       1/15/2023   [facility]  [facility] EMERGENCY DEPT     Silver Vora MD  01/15/23 [id number]

## 2023-01-16 NOTE — ED TRIAGE NOTES
"Pt presents with right ankle and left shoulder pain. Pt reported she \"could have fallen last night\". No deformities, or swelling noted.      Triage Assessment       Row Name 01/15/23 [id number]       Triage Assessment (Adult)    Airway WDL WDL       Respiratory WDL    Respiratory WDL WDL       Skin Circulation/Temperature WDL    Skin Circulation/Temperature WDL WDL       Cardiac WDL    Cardiac WDL WDL       Cognitive/Neuro/Behavioral WDL    Cognitive/Neuro/Behavioral WDL WDL                  "

## 2023-01-16 NOTE — DISCHARGE INSTRUCTIONS
Partial tear in the Achilles tendon  Walking boot given.    Tylenol and ibuprofen as needed for pain.

## 2023-03-24 NOTE — TELEPHONE ENCOUNTER
FYI to provider - Patient is lost to pap tracking follow-up. Attempts to contact pt have been made per reminder process and there has been no reply and/or no appt scheduled. Contact hx listed below.     10/1/07 LSIL. Rec colpo.  1/3/08 LSIL, + HPV 58. Rec Colpo  2/11/08 LSIL. Colpo-SANA 3. Rec LEEP.  4/24/08 LEEP SANA 3, + endocervical margins.   4/15/09 NIL. Repeat pap 1 year. Needs yearly paps for 20 years (2028).  1/20/10 NIL. Repeat pap 1 year.  10/27/11 NIL Pap, + HR HPV. (Care Everywhere)  4/4/13 NIL. Pap in 1 year.  9/5/14 NIL, +HR HPV type 16. Plan colp  9/19/14 [place] - Negative for dysplasia. Plan cotest in 1 year.    10/1/2015 NIL, +HR HPV type 16. Plan [place]-  1/6/16: [place] not done. Tracking updated for 6 mo pap.   6/22/16 NIL Pap, + HR HPV 16. Plan colp  8/9/16 [place] ECC - negative. Plan cotest in 1 year.   10/16/18 ASCUS Pap, + HR HPV 16 (neg 18 & other). Plan colp  6/7/19 Lost to follow-up for pap tracking  2/13/20 ASC[facility] pap, + HR HPV 16. Plan: colpo  10/07/20 Patient is lost to pap tracking follow-up.   2/3/21 ASCUS Pap, + HR HPV 16 (neg 18 & other). [place] due by 5/3/21  8/20/21 Lost to follow-up for pap tracking  12/23/21 [place] ECC - SANA 2-3. LEEP due by 3/23/2022.  2/10/22 LEEP SANA 2-3, extending to margins, ECC minute fragment suspicious for HSIL. Plan: cotest in 6 months  2/17/22 Mychart results sent / mychart read  7/21/22 Reminder mychart  9/21/22 Appt-No show  10/24/22 Reminder call. Left msg  11/22/22 No appt. Will update to cotest in 1 year  1/23/23 Reminder mychart -- read  2/20/23 Reminder call -- left message  3/24/23 Lost to follow-up for pap tracking     Emely Rubio RN BSN, Pap Tracking

## 2023-06-01 ENCOUNTER — HEALTH MAINTENANCE LETTER (OUTPATIENT)
Age: 33
End: 2023-06-01

## 2023-11-30 ENCOUNTER — HOSPITAL ENCOUNTER (EMERGENCY)
Facility: CLINIC | Age: 33
Discharge: HOME | End: 2023-12-01
Attending: FAMILY MEDICINE | Admitting: FAMILY MEDICINE
Payer: COMMERCIAL

## 2023-11-30 DIAGNOSIS — L73.9 FOLLICULITIS: ICD-10-CM

## 2023-11-30 PROCEDURE — 99283 EMERGENCY DEPT VISIT LOW MDM: CPT | Performed by: FAMILY MEDICINE

## 2023-12-01 VITALS
RESPIRATION RATE: 16 BRPM | WEIGHT: 192 LBS | BODY MASS INDEX: 30.13 KG/M2 | HEIGHT: 67 IN | TEMPERATURE: 98.2 F | HEART RATE: 79 BPM | SYSTOLIC BLOOD PRESSURE: 136 MMHG | DIASTOLIC BLOOD PRESSURE: 94 MMHG | OXYGEN SATURATION: 94 %

## 2023-12-01 RX ORDER — CIPROFLOXACIN 500 MG/1
500 TABLET, FILM COATED ORAL 2 TIMES DAILY
Qty: 10 TABLET | Refills: 0 | Status: SHIPPED | OUTPATIENT
Start: 2023-12-01

## 2023-12-01 NOTE — DISCHARGE INSTRUCTIONS
RETURN TO THE EMERGENCY ROOM FOR THE FOLLOWING:    Newly worsened pain, expanding redness/swelling/tenderness and fever greater than 101, or at anytime for any concern.    FOLLOW UP:    With your primary clinic as needed or if not improved over the next 7 days.    TREATMENT RECOMMENDATIONS:    Ciprofloxacin 500 mg twice a day for 5 days.    NURSE ADVICE LINE:  [phone] or [phone]

## 2023-12-01 NOTE — ED TRIAGE NOTES
Patient reports tender lumps and redness under bilateral armpits. Patient reports sick 2 weeks ago with body aches, fever, chills.  Returned from [place] today and has bites from noseums on bilateral legs, buttocks, and abdomen.     Triage Assessment (Adult)       Row Name 11/30/23 [id number]          Triage Assessment    Airway WDL WDL        Respiratory WDL    Respiratory WDL WDL        Skin Circulation/Temperature WDL    Skin Circulation/Temperature WDL WDL        Cardiac WDL    Cardiac WDL WDL        Peripheral/Neurovascular WDL    Peripheral Neurovascular WDL WDL        Cognitive/Neuro/Behavioral WDL    Cognitive/Neuro/Behavioral WDL WDL

## 2023-12-01 NOTE — ED PROVIDER NOTES
"  HPI   Patient is a 33-year-old female presenting with rash.  She was in [place] over the past few days and returned today.  She was in the hot tub and recognized a rash on her buttock and on her abdomen starting 2 days ago.  This is swollen, red, pustular, and painful.  She also describes \"small bites related to noseeum flies\" on her legs below the knees, bilaterally.  These are pruritic.  No pustular changes.  No drainage.  She also has red tender areas that are swollen in both axilla.  The lymph nodes in the axilla are enlarged and tender.  She did shave with an old razor in the axilla only.  No pustular change.  No drainage.  No fever or systemic symptoms.        Allergies:  No Known Allergies  Problem List:    Patient Active Problem List    Diagnosis Date Noted    mirena IUD 2/10/2022 02/10/2022     Priority: Medium     Mirena IUD placed 2/10/2022  LOT# [id number]  Exp: 04/2024  NDC# 16871-199-89    Rena Delgado on 2/10/2022 at 11:33 AM          Atrial fibrillation (H) 02/17/2013     Priority: Medium    CARDIOVASCULAR SCREENING; LDL GOAL LESS THAN 160 10/31/2010     Priority: Medium    Severe dysplasia of cervix (SANA III) 10/10/2007     Priority: Medium     10/1/07 LSIL. Rec colpo.  1/3/08 LSIL, + HPV 58. Rec Colpo  2/11/08 LSIL. Colpo-SANA 3. Rec LEEP.  4/24/08 LEEP SANA 3, + endocervical margins.   4/15/09 NIL. Repeat pap 1 year. Needs yearly paps for 20 years (2028).  1/20/10 NIL. Repeat pap 1 year.  10/27/11 NIL Pap, + HR HPV. (Care Everywhere)  4/4/13 NIL. Pap in 1 year.  9/5/14 NIL, +HR HPV type 16. Plan colp  9/19/14 [place] - Negative for dysplasia. Plan cotest in 1 year.    10/1/2015 NIL, +HR HPV type 16. Plan [place]-  1/6/16: [place] not done. Tracking updated for 6 mo pap.   6/22/16 NIL Pap, + HR HPV 16. Plan colp  8/9/16 [place] [facility] - negative. Plan cotest in 1 year.   10/16/18 ASCUS Pap, + HR HPV 16 (neg 18 & other). Plan colp  6/7/19 Lost to follow-up for pap tracking  2/13/20 ASCUS pap, + HR HPV 16. Plan: " colpo  10/07/20 Patient is lost to pap tracking follow-up.   2/3/21 ASCUS Pap, + HR HPV 16 (neg 18 & other). [place] due by 5/3/21  8/20/21 Lost to follow-up for pap tracking  21 [place] [facility] - SANA 2-3. LEEP due by 3/23/2022.  2/10/22 LEEP SANA 2-3, extending to margins, ECC minute fragment suspicious for HSIL. Plan: cotest in 6 months  3/24/23 Lost to follow-up for pap tracking       BMI 30.0-30.9,adult 10/16/2006     Priority: Medium    Tobacco use disorder 2005     Priority: Medium      Past Medical History:    Past Medical History:   Diagnosis Date    Abnormal Pap smear of cervix     Cervical high risk HPV (human papillomavirus) test positive     H/O colposcopy with cervical biopsy 2008    H/O colposcopy with cervical biopsy 2014    History of colposcopy 2016    IUD (intrauterine device) in place 2020    Tobacco use disorder      Past Surgical History:    Past Surgical History:   Procedure Laterality Date    ANESTHESIA CARDIOVERSION  2013    Procedure: ANESTHESIA CARDIOVERSION;  Cardioversion;  Surgeon: Provider, Generic Anesthesia;  Location: [facility] OR    LEEP TX, CERVICAL  2008    SANA 3     Family History:    Family History   Problem Relation Age of Onset    Eye Disorder Paternal Grandmother         glaucoma    Hypertension Paternal Grandfather     Connective Tissue Disorder Sister         psoriasis    Alcohol/Drug Mother     Depression Father      Social History:  Marital Status:  Single [1]  Social History     Tobacco Use    Smoking status: Former     Packs/day: 0.50     Years: 11.00     Additional pack years: 0.00     Total pack years: 5.50     Types: Cigarettes     Start date: 2014     Quit date: 2021     Years since quittin.0    Smokeless tobacco: Never    Tobacco comments:     quit on 2021   Vaping Use    Vaping Use: Never used   Substance Use Topics    Alcohol use: Yes     Alcohol/week: 0.0 standard drinks of alcohol     Comment: mixed 4-10 a week    Drug  "use: No     Comment: ho marijuana use and \"dabbling in coke and meth\" during high school, clean >4 yrs      Medications:    ciprofloxacin (CIPRO) 500 MG tablet  levonorgestrel (MIRENA) 20 MCG/24HR IUD  levonorgestrel (MIRENA) 20 MCG/24HR IUD      Review of Systems   All other systems reviewed and are negative.      PE   BP: (!) 139/102  Pulse: 79  Temp: 98.2  F (36.8  C)  Resp: 16  Height: 170.2 cm (5' 7\")  Weight: 87.1 kg (192 lb)  SpO2: 98 %  Physical Exam  Vitals reviewed.   Constitutional:       General: She is not in acute distress.     Appearance: She is well-developed.   HENT:      Head: Normocephalic and atraumatic.      Right Ear: External ear normal.      Left Ear: External ear normal.      Nose: Nose normal.      Mouth/Throat:      Mouth: Mucous membranes are moist.      Pharynx: Oropharynx is clear.   Eyes:      Extraocular Movements: Extraocular movements intact.      Conjunctiva/sclera: Conjunctivae normal.      Pupils: Pupils are equal, round, and reactive to light.   Cardiovascular:      Rate and Rhythm: Normal rate and regular rhythm.   Pulmonary:      Effort: Pulmonary effort is normal.   Musculoskeletal:         General: Normal range of motion.      Cervical back: Normal range of motion.   Skin:     General: Skin is warm and dry.      Comments: Both axilla are shaved.  There are 4-7 small red swollen bumps in each.  No pustules.  Underlying adenopathy present.  Tender.  The right buttock has erythematous papules with a pustular head.  There are about 10-15 of these.  There is a smaller example on the left abdomen.  Both lower extremities below the knees have small red papules without pustules.   Neurological:      Mental Status: She is alert and oriented to person, place, and time.   Psychiatric:         Behavior: Behavior normal.         ED COURSE and MDM   1230  Patient has symptoms and signs as described above.  I suspect the patient has folliculitis after shaving in the axilla.  I think she " has hot tub for colitis involving her buttocks and abdomen.  I think she is bites on the legs.  We talked about watchful waiting but she tells me everything is worsening.  I will cover with ciprofloxacin for potential Pseudomonas of the hot tub folliculitis.  This should also cover usual skin ze with the axillary infection.  Low concern for invasive infection.  No blood work at this time.  No imaging.  No hospitalization or consultation.    Electronic medical chart reviewed, including medical problems, medications, medical allergies, social history.  Recent hospitalizations and surgical procedures reviewed.  Recent clinic visits and consultations reviewed.  Recent labs and test results reviewed.  Nursing notes reviewed.    The patient, their parent if applicable, and/or their medical decision maker(s) and I have reviewed all of the available historical information, applicable PMH, physical exam findings, and objective diagnostic data gathered during this ED visit.  We then discussed all work-up options and then together agreed upon the course taken during this visit.  The ultimate disposition and plan was a cooperative decision made between myself and the patient, their parent if applicable, and/or their legal decision maker(s).  The risks and benefits of all decisions made during this visit were discussed to the best of my abilities given the circumstances, and all parties are understanding of the pertinent ramifications of these decisions.      LABS  Labs Ordered and Resulted from Time of ED Arrival to Time of ED Departure - No data to display    IMAGING  Images reviewed by me.  Radiology report also reviewed.  No orders to display       Procedures    Medications - No data to display      IMPRESSION       ICD-10-CM    1. Folliculitis  L73.9                Medication List        Started      ciprofloxacin 500 MG tablet  Commonly known as: CIPRO  500 mg, Oral, 2 TIMES DAILY                          Rivera Mcrae  MD MARTIN  12/01/23 0031

## 2024-06-16 ENCOUNTER — HEALTH MAINTENANCE LETTER (OUTPATIENT)
Age: 34
End: 2024-06-16

## 2024-09-17 ENCOUNTER — HOSPITAL ENCOUNTER (EMERGENCY)
Facility: CLINIC | Age: 34
Discharge: HOME | End: 2024-09-17
Attending: EMERGENCY MEDICINE | Admitting: EMERGENCY MEDICINE
Payer: COMMERCIAL

## 2024-09-17 ENCOUNTER — APPOINTMENT (OUTPATIENT)
Dept: CT IMAGING | Facility: CLINIC | Age: 34
End: 2024-09-17
Attending: EMERGENCY MEDICINE
Payer: COMMERCIAL

## 2024-09-17 VITALS
SYSTOLIC BLOOD PRESSURE: 161 MMHG | WEIGHT: 192 LBS | TEMPERATURE: 98.4 F | HEART RATE: 87 BPM | BODY MASS INDEX: 30.13 KG/M2 | HEIGHT: 67 IN | OXYGEN SATURATION: 94 % | DIASTOLIC BLOOD PRESSURE: 117 MMHG | RESPIRATION RATE: 12 BRPM

## 2024-09-17 DIAGNOSIS — J02.9 ACUTE SORE THROAT: ICD-10-CM

## 2024-09-17 DIAGNOSIS — R93.89 ABNORMAL CT SCAN: ICD-10-CM

## 2024-09-17 DIAGNOSIS — R10.12 ABDOMINAL PAIN, LEFT UPPER QUADRANT: ICD-10-CM

## 2024-09-17 LAB
ALBUMIN SERPL BCG-MCNC: 4.1 G/DL (ref 3.5–5.2)
ALBUMIN UR-MCNC: NEGATIVE MG/DL
ALP SERPL-CCNC: 85 U/L (ref 40–150)
ALT SERPL W P-5'-P-CCNC: 26 U/L (ref 0–50)
ANION GAP SERPL CALCULATED.3IONS-SCNC: 11 MMOL/L (ref 7–15)
APPEARANCE UR: CLEAR
AST SERPL W P-5'-P-CCNC: 26 U/L (ref 0–45)
BILIRUB SERPL-MCNC: 1.7 MG/DL
BILIRUB UR QL STRIP: NEGATIVE
BUN SERPL-MCNC: 7.5 MG/DL (ref 6–20)
CALCIUM SERPL-MCNC: 9.1 MG/DL (ref 8.8–10.4)
CHLORIDE SERPL-SCNC: 98 MMOL/L (ref 98–107)
COLOR UR AUTO: YELLOW
CREAT SERPL-MCNC: 0.64 MG/DL (ref 0.51–0.95)
CRP SERPL-MCNC: 30.45 MG/L
EGFRCR SERPLBLD CKD-EPI 2021: >90 ML/MIN/1.73M2
ERYTHROCYTE [DISTWIDTH] IN BLOOD BY AUTOMATED COUNT: 13.1 % (ref 10–15)
GLUCOSE SERPL-MCNC: 101 MG/DL (ref 70–99)
GLUCOSE UR STRIP-MCNC: NEGATIVE MG/DL
GROUP A STREP BY PCR: DETECTED
HCG UR QL: NEGATIVE
HCO3 SERPL-SCNC: 29 MMOL/L (ref 22–29)
HCT VFR BLD AUTO: 36.5 % (ref 35–47)
HGB BLD-MCNC: 13.5 G/DL (ref 11.7–15.7)
HGB UR QL STRIP: NEGATIVE
HOLD SPECIMEN: NORMAL
KETONES UR STRIP-MCNC: NEGATIVE MG/DL
LEUKOCYTE ESTERASE UR QL STRIP: NEGATIVE
LIPASE SERPL-CCNC: 161 U/L (ref 13–60)
MCH RBC QN AUTO: 33.9 PG (ref 26.5–33)
MCHC RBC AUTO-ENTMCNC: 37 G/DL (ref 31.5–36.5)
MCV RBC AUTO: 92 FL (ref 78–100)
MUCOUS THREADS #/AREA URNS LPF: PRESENT /LPF
NITRATE UR QL: NEGATIVE
PH UR STRIP: 7 [PH] (ref 5–7)
PLATELET # BLD AUTO: 189 10E3/UL (ref 150–450)
POTASSIUM SERPL-SCNC: 3 MMOL/L (ref 3.4–5.3)
PROT SERPL-MCNC: 6.9 G/DL (ref 6.4–8.3)
RBC # BLD AUTO: 3.98 10E6/UL (ref 3.8–5.2)
RBC URINE: 1 /HPF
SODIUM SERPL-SCNC: 138 MMOL/L (ref 135–145)
SP GR UR STRIP: 1.01 (ref 1–1.03)
SQUAMOUS EPITHELIAL: 5 /HPF
UROBILINOGEN UR STRIP-MCNC: NORMAL MG/DL
WBC # BLD AUTO: 11.5 10E3/UL (ref 4–11)
WBC URINE: 2 /HPF

## 2024-09-17 PROCEDURE — 250N000011 HC RX IP 250 OP 636: Performed by: EMERGENCY MEDICINE

## 2024-09-17 PROCEDURE — 99285 EMERGENCY DEPT VISIT HI MDM: CPT | Mod: 25 | Performed by: EMERGENCY MEDICINE

## 2024-09-17 PROCEDURE — 74177 CT ABD & PELVIS W/CONTRAST: CPT

## 2024-09-17 PROCEDURE — 96375 TX/PRO/DX INJ NEW DRUG ADDON: CPT | Performed by: EMERGENCY MEDICINE

## 2024-09-17 PROCEDURE — 85027 COMPLETE CBC AUTOMATED: CPT | Performed by: EMERGENCY MEDICINE

## 2024-09-17 PROCEDURE — 258N000003 HC RX IP 258 OP 636: Performed by: EMERGENCY MEDICINE

## 2024-09-17 PROCEDURE — 96374 THER/PROPH/DIAG INJ IV PUSH: CPT | Mod: 59 | Performed by: EMERGENCY MEDICINE

## 2024-09-17 PROCEDURE — 99284 EMERGENCY DEPT VISIT MOD MDM: CPT | Performed by: EMERGENCY MEDICINE

## 2024-09-17 PROCEDURE — 96361 HYDRATE IV INFUSION ADD-ON: CPT | Performed by: EMERGENCY MEDICINE

## 2024-09-17 PROCEDURE — 82374 ASSAY BLOOD CARBON DIOXIDE: CPT | Performed by: EMERGENCY MEDICINE

## 2024-09-17 PROCEDURE — 250N000009 HC RX 250: Performed by: EMERGENCY MEDICINE

## 2024-09-17 PROCEDURE — 82040 ASSAY OF SERUM ALBUMIN: CPT | Performed by: EMERGENCY MEDICINE

## 2024-09-17 PROCEDURE — 36415 COLL VENOUS BLD VENIPUNCTURE: CPT | Performed by: FAMILY MEDICINE

## 2024-09-17 PROCEDURE — 83690 ASSAY OF LIPASE: CPT | Performed by: EMERGENCY MEDICINE

## 2024-09-17 PROCEDURE — 86140 C-REACTIVE PROTEIN: CPT | Performed by: EMERGENCY MEDICINE

## 2024-09-17 PROCEDURE — 81001 URINALYSIS AUTO W/SCOPE: CPT | Performed by: EMERGENCY MEDICINE

## 2024-09-17 PROCEDURE — 87651 STREP A DNA AMP PROBE: CPT | Performed by: EMERGENCY MEDICINE

## 2024-09-17 PROCEDURE — 81025 URINE PREGNANCY TEST: CPT | Performed by: EMERGENCY MEDICINE

## 2024-09-17 RX ORDER — KETOROLAC TROMETHAMINE 15 MG/ML
10 INJECTION, SOLUTION INTRAMUSCULAR; INTRAVENOUS
Status: COMPLETED | OUTPATIENT
Start: 2024-09-17 | End: 2024-09-17

## 2024-09-17 RX ORDER — AMOXICILLIN 500 MG/1
1000 CAPSULE ORAL DAILY
Qty: 20 CAPSULE | Refills: 0 | Status: SHIPPED | OUTPATIENT
Start: 2024-09-17 | End: 2024-09-27

## 2024-09-17 RX ORDER — HYDROMORPHONE HYDROCHLORIDE 1 MG/ML
0.5 INJECTION, SOLUTION INTRAMUSCULAR; INTRAVENOUS; SUBCUTANEOUS EVERY 30 MIN PRN
Status: DISCONTINUED | OUTPATIENT
Start: 2024-09-17 | End: 2024-09-17 | Stop reason: HOSPADM

## 2024-09-17 RX ORDER — OXYCODONE HYDROCHLORIDE 5 MG/1
5 TABLET ORAL EVERY 8 HOURS PRN
Qty: 10 TABLET | Refills: 0 | Status: SHIPPED | OUTPATIENT
Start: 2024-09-17

## 2024-09-17 RX ORDER — SODIUM CHLORIDE, SODIUM LACTATE, POTASSIUM CHLORIDE, CALCIUM CHLORIDE 600; 310; 30; 20 MG/100ML; MG/100ML; MG/100ML; MG/100ML
1000 INJECTION, SOLUTION INTRAVENOUS CONTINUOUS
Status: DISCONTINUED | OUTPATIENT
Start: 2024-09-17 | End: 2024-09-17 | Stop reason: HOSPADM

## 2024-09-17 RX ORDER — IOPAMIDOL 755 MG/ML
94 INJECTION, SOLUTION INTRAVASCULAR ONCE
Status: COMPLETED | OUTPATIENT
Start: 2024-09-17 | End: 2024-09-17

## 2024-09-17 RX ADMIN — SODIUM CHLORIDE 64 ML: 9 INJECTION, SOLUTION INTRAVENOUS at 11:42

## 2024-09-17 RX ADMIN — SODIUM CHLORIDE, POTASSIUM CHLORIDE, SODIUM LACTATE AND CALCIUM CHLORIDE 1000 ML: 600; 310; 30; 20 INJECTION, SOLUTION INTRAVENOUS at 09:22

## 2024-09-17 RX ADMIN — IOPAMIDOL 94 ML: 755 INJECTION, SOLUTION INTRAVENOUS at 11:42

## 2024-09-17 RX ADMIN — KETOROLAC TROMETHAMINE 10 MG: 15 INJECTION, SOLUTION INTRAMUSCULAR; INTRAVENOUS at 09:22

## 2024-09-17 RX ADMIN — HYDROMORPHONE HYDROCHLORIDE 0.5 MG: 1 INJECTION, SOLUTION INTRAMUSCULAR; INTRAVENOUS; SUBCUTANEOUS at 09:22

## 2024-09-17 ASSESSMENT — ENCOUNTER SYMPTOMS
CARDIOVASCULAR NEGATIVE: 1
HEMATOLOGIC/LYMPHATIC NEGATIVE: 1
ALLERGIC/IMMUNOLOGIC NEGATIVE: 1
MUSCULOSKELETAL NEGATIVE: 1
SORE THROAT: 1
ABDOMINAL PAIN: 1
NEUROLOGICAL NEGATIVE: 1
RESPIRATORY NEGATIVE: 1
EYES NEGATIVE: 1
ENDOCRINE NEGATIVE: 1
PSYCHIATRIC NEGATIVE: 1
CONSTITUTIONAL NEGATIVE: 1

## 2024-09-17 ASSESSMENT — ACTIVITIES OF DAILY LIVING (ADL)
ADLS_ACUITY_SCORE: 35

## 2024-09-17 ASSESSMENT — COLUMBIA-SUICIDE SEVERITY RATING SCALE - C-SSRS
2. HAVE YOU ACTUALLY HAD ANY THOUGHTS OF KILLING YOURSELF IN THE PAST MONTH?: NO
6. HAVE YOU EVER DONE ANYTHING, STARTED TO DO ANYTHING, OR PREPARED TO DO ANYTHING TO END YOUR LIFE?: NO
1. IN THE PAST MONTH, HAVE YOU WISHED YOU WERE DEAD OR WISHED YOU COULD GO TO SLEEP AND NOT WAKE UP?: NO

## 2024-09-17 NOTE — ED PROVIDER NOTES
History     Chief Complaint   Patient presents with    Abdominal Pain     LUQ dull pain started yesterday morning feels full     HPI  Felisha Zapata is a 34 year old female who presents with report of left upper quadrant pain in the last 24 hours.  He describes discomfort as a dull ache in the sense of fullness.  Review the medical record.  Patient has a previous diagnosis of atrial fibrillation tobacco use disorder. Current prescribed  medications were reviewed with patient at bedside.   On examination patient arrived alone by car from home in [facility].  History of sore throat yesterday.  She took some leftover amoxicillin., which she had leftover from when her daughter was diagnosed with strep pharyngitis a month prior.  She reports a history of atrial fibrillation paroxysmal but not on any rate control medication or anticoagulation.  No prior abdominal surgeries. She has an IUD and reports history of irregular periods.  No back pain or flank pain.  She has had no rash.  No urinary symptoms.  She reports nausea but no vomiting.  She feels bloated and reports her pain is a dull pressure. Symptoms began approximately 8 PM.  She was not able to eat although she has sipping water.    Allergies:  No Known Allergies    Problem List:    Patient Active Problem List    Diagnosis Date Noted    mirena IUD 2/10/2022 02/10/2022     Priority: Medium     Mirena IUD placed 2/10/2022  LOT# [id number]  Exp: 04/2024  NDC# 15427-864-70    Rena Delgado on 2/10/2022 at 11:33 AM          Atrial fibrillation (H) 02/17/2013     Priority: Medium    CARDIOVASCULAR SCREENING; LDL GOAL LESS THAN 160 10/31/2010     Priority: Medium    Severe dysplasia of cervix (SANA III) 10/10/2007     Priority: Medium     10/1/07 LSIL. Rec colpo.  1/3/08 LSIL, + HPV 58. Rec Colpo  2/11/08 LSIL. Colpo-SANA 3. Rec LEEP.  4/24/08 LEEP SANA 3, + endocervical margins.   4/15/09 NIL. Repeat pap 1 year. Needs yearly paps for 20 years  (2028).  1/20/10 NIL. Repeat pap 1 year.  10/27/11 NIL Pap, + HR HPV. (Care Everywhere)  4/4/13 NIL. Pap in 1 year.  9/5/14 NIL, +HR HPV type 16. Plan colp  9/19/14 [place] - Negative for dysplasia. Plan cotest in 1 year.    10/1/2015 NIL, +HR HPV type 16. Plan [place]-  1/6/16: [place] not done. Tracking updated for 6 mo pap.   6/22/16 NIL Pap, + HR HPV 16. Plan colp  8/9/16 [facility] [facility] - negative. Plan cotest in 1 year.   10/16/18 ASCUS Pap, + HR HPV 16 (neg 18 & other). Plan colp  6/7/19 Lost to follow-up for pap tracking  2/13/20 ASCUS pap, + HR HPV 16. Plan: colpo  10/07/20 Patient is lost to pap tracking follow-up.   2/3/21 ASCUS Pap, + HR HPV 16 (neg 18 & other). [place] due by 5/3/21  8/20/21 Lost to follow-up for pap tracking  12/23/21 [facility] [facility] - SANA 2-3. LEEP due by 3/23/2022.  2/10/22 LEEP SANA 2-3, extending to margins, ECC minute fragment suspicious for HSIL. Plan: cotest in 6 months  3/24/23 Lost to follow-up for pap tracking       BMI 30.0-30.9,adult 10/16/2006     Priority: Medium    Tobacco use disorder 05/09/2005     Priority: Medium        Past Medical History:    Past Medical History:   Diagnosis Date    Abnormal Pap smear of cervix 2007    Cervical high risk HPV (human papillomavirus) test positive 2008    H/O colposcopy with cervical biopsy 02/2008    H/O colposcopy with cervical biopsy 09/2014    History of colposcopy 08/09/2016    IUD (intrauterine device) in place 2020    Tobacco use disorder        Past Surgical History:    Past Surgical History:   Procedure Laterality Date    ANESTHESIA CARDIOVERSION  2/18/2013    Procedure: ANESTHESIA CARDIOVERSION;  Cardioversion;  Surgeon: Provider, Generic Anesthesia;  Location: [facility] OR    LEEP TX, CERVICAL  4/2008    SANA 3       Family History:    Family History   Problem Relation Age of Onset    Eye Disorder Paternal Grandmother         glaucoma    Hypertension Paternal Grandfather     Connective Tissue Disorder Sister         psoriasis    Alcohol/Drug Mother      "Depression Father        Social History:  Marital Status:  Single [1]  Social History     Tobacco Use    Smoking status: Former     Current packs/day: 0.00     Average packs/day: 0.5 packs/day for 11.0 years (5.5 ttl pk-yrs)     Types: Cigarettes     Start date: 2014     Quit date: 2021     Years since quittin.8    Smokeless tobacco: Never    Tobacco comments:     quit on 2021   Vaping Use    Vaping status: Never Used   Substance Use Topics    Alcohol use: Yes     Alcohol/week: 0.0 standard drinks of alcohol     Comment: mixed 4-10 a week    Drug use: No     Comment: ho marijuana use and \"dabbling in coke and meth\" during high school, clean >4 yrs        Medications:    ciprofloxacin (CIPRO) 500 MG tablet  levonorgestrel (MIRENA) 20 MCG/24HR IUD  levonorgestrel (MIRENA) 20 MCG/24HR IUD          Review of Systems   Constitutional: Negative.    HENT:  Positive for sore throat.    Eyes: Negative.    Respiratory: Negative.     Cardiovascular: Negative.    Gastrointestinal:  Positive for abdominal pain.   Endocrine: Negative.    Genitourinary: Negative.    Musculoskeletal: Negative.    Skin: Negative.    Allergic/Immunologic: Negative.    Neurological: Negative.    Hematological: Negative.    Psychiatric/Behavioral: Negative.         Physical Exam   BP: (!) 180/121  Pulse: 94  Temp: 98.4  F (36.9  C)  Resp: 18  Height: 170.2 cm (5' 7\")  Weight: 87.1 kg (192 lb)  SpO2: 96 %      Physical Exam  Constitutional:       General: She is not in acute distress.     Appearance: She is well-developed. She is not ill-appearing, toxic-appearing or diaphoretic.   HENT:      Head: Normocephalic and atraumatic.   Eyes:      Extraocular Movements: Extraocular movements intact.      Pupils: Pupils are equal, round, and reactive to light.   Cardiovascular:      Rate and Rhythm: Normal rate and regular rhythm.      Heart sounds: Normal heart sounds.   Pulmonary:      Effort: Pulmonary effort is normal.      Breath " sounds: Normal breath sounds.   Abdominal:      General: Abdomen is flat. Bowel sounds are increased.      Palpations: Abdomen is soft.      Tenderness: There is abdominal tenderness in the left upper quadrant.       Genitourinary:     Adnexa:         Right: No mass, tenderness or fullness.          Left: No mass, tenderness or fullness.        Rectum: Normal.   Skin:     Coloration: Skin is not cyanotic or mottled.      Findings: No erythema.   Neurological:      General: No focal deficit present.      Mental Status: She is alert and oriented to person, place, and time.      Cranial Nerves: No cranial nerve deficit.      Motor: No weakness.   Psychiatric:         Mood and Affect: Mood normal. Mood is not anxious or depressed.         Behavior: Behavior normal.         ED Course        Procedures              Critical Care time:  none             ED medications:  Medications   lactated ringers infusion 1,000 mL (has no administration in time range)   HYDROmorphone (PF) (DILAUDID) injection 0.5 mg (0.5 mg Intravenous $Given 9/17/24 0922)   lactated ringers BOLUS 1,000 mL (1,000 mLs Intravenous $New Bag 9/17/24 0922)   ketorolac (TORADOL) injection 10 mg (10 mg Intravenous $Given 9/17/24 0922)   iopamidol (ISOVUE-370) solution 94 mL (94 mLs Intravenous $Given 9/17/24 1142)   sodium chloride 0.9 % bag 500mL for CT scan flush use (64 mLs Intravenous $Given 9/17/24 1142)        ED Vitals:  Vitals:    09/17/24 0900 09/17/24 0915 09/17/24 0930 09/17/24 0945   BP: (!) 174/107 (!) 181/119  (!) 161/117   Pulse: 74 80 85 87   Resp: 17 17 19 12   Temp:       TempSrc:       SpO2: 96% 95% 94% 94%   Weight:       Height:          ED labs and imaging:  Vitals:    09/17/24 0900 09/17/24 0915 09/17/24 0930 09/17/24 0945   BP: (!) 174/107 (!) 181/119  (!) 161/117   Pulse: 74 80 85 87   Resp: 17 17 19 12   Temp:       TempSrc:       SpO2: 96% 95% 94% 94%   Weight:       Height:          Results for orders placed or performed during the  hospital encounter of 09/17/24 (from the past 24 hour(s))   [place] Draw    Narrative    The following orders were created for panel order [place] Draw.  Procedure                               Abnormality         Status                     ---------                               -----------         ------                     Extra Blue Top Tube[[id number]]                              Final result               Extra Red Top Tube[[id number]]                               Final result               Extra Green Top (Lithium...[[id number]]                      Final result               Extra Purple Top Tube[[id number]]                            Final result                 Please view results for these tests on the individual orders.   Extra Blue Top Tube   Result Value Ref Range    Hold Specimen JIC    Extra Red Top Tube   Result Value Ref Range    Hold Specimen JIC    Extra Green Top (Lithium Heparin) Tube   Result Value Ref Range    Hold Specimen JIC    Extra Purple Top Tube   Result Value Ref Range    Hold Specimen JIC    CBC with platelets   Result Value Ref Range    WBC Count 11.5 (H) 4.0 - 11.0 10e3/uL    RBC Count 3.98 3.80 - 5.20 10e6/uL    Hemoglobin 13.5 11.7 - 15.7 g/dL    Hematocrit 36.5 35.0 - 47.0 %    MCV 92 78 - 100 fL    MCH 33.9 (H) 26.5 - 33.0 pg    MCHC 37.0 (H) 31.5 - 36.5 g/dL    RDW 13.1 10.0 - 15.0 %    Platelet Count 189 150 - 450 10e3/uL   Comprehensive metabolic panel   Result Value Ref Range    Sodium 138 135 - 145 mmol/L    Potassium 3.0 (L) 3.4 - 5.3 mmol/L    Carbon Dioxide (CO2) 29 22 - 29 mmol/L    Anion Gap 11 7 - 15 mmol/L    Urea Nitrogen 7.5 6.0 - 20.0 mg/dL    Creatinine 0.64 0.51 - 0.95 mg/dL    GFR Estimate >90 >60 mL/min/1.73m2    Calcium 9.1 8.8 - 10.4 mg/dL    Chloride 98 98 - 107 mmol/L    Glucose 101 (H) 70 - 99 mg/dL    Alkaline Phosphatase 85 40 - 150 U/L    AST 26 0 - 45 U/L    ALT 26 0 - 50 U/L    Protein Total 6.9 6.4 - 8.3 g/dL    Albumin 4.1 3.5 - 5.2 g/dL     Bilirubin Total 1.7 (H) <=1.2 mg/dL   Lipase   Result Value Ref Range    Lipase 161 (H) 13 - 60 U/L   CRP Inflammation   Result Value Ref Range    CRP Inflammation 30.45 (H) <5.00 mg/L   UA with Microscopic reflex to Culture    Specimen: Urine, Clean Catch   Result Value Ref Range    Color Urine Yellow Colorless, Straw, Light Yellow, Yellow    Appearance Urine Clear Clear    Glucose Urine Negative Negative mg/dL    Bilirubin Urine Negative Negative    Ketones Urine Negative Negative mg/dL    Specific Gravity Urine 1.013 1.003 - 1.035    Blood Urine Negative Negative    pH Urine 7.0 5.0 - 7.0    Protein Albumin Urine Negative Negative mg/dL    Urobilinogen Urine Normal Normal, 2.0 mg/dL    Nitrite Urine Negative Negative    Leukocyte Esterase Urine Negative Negative    Mucus Urine Present (A) None Seen /LPF    RBC Urine 1 <=2 /HPF    WBC Urine 2 <=5 /HPF    Squamous Epithelials Urine 5 (H) <=1 /HPF    Narrative    Urine Culture not indicated   HCG qualitative urine   Result Value Ref Range    hCG Urine Qualitative Negative Negative   Group A Streptococcus PCR Throat Swab    Specimen: Throat; Swab   Result Value Ref Range    Group A strep by PCR Detected (A) Not Detected    Narrative    The Xpert Xpress Strep A test, performed on the [facility], is a rapid, qualitative in vitro diagnostic test for the detection of Streptococcus pyogenes (Group A ß-hemolytic Streptococcus, Strep A) in throat swab specimens from patients with signs and symptoms of pharyngitis. The Xpert Xpress Strep A test can be used as an aid in the diagnosis of Group A Streptococcal pharyngitis. The assay is not intended to monitor treatment for Group A Streptococcus infections. The Xpert Xpress Strep A test utilizes an automated real-time polymerase chain reaction (PCR) to detect Streptococcus pyogenes DNA.   CT Abdomen Pelvis w Contrast    Narrative    CT ABDOMEN PELVIS W CONTRAST 9/17/2024 11:50 AM    CLINICAL HISTORY: Hx of  atrial fibrillation.  Left upper quadrant  pain.  Evaluate for acute intra-abdominal process and/or catastrophe    TECHNIQUE: CT scan of the abdomen and pelvis was performed following  injection of IV contrast. Multiplanar reformats were obtained. Dose  reduction techniques were used.  CONTRAST: 94  mL Isovue-370    COMPARISON: None.    FINDINGS:   LOWER CHEST: Normal.    HEPATOBILIARY: Mild hepatic steatosis. No calcified gallstones or  biliary ductal dilatation.    PANCREAS: Peripancreatic inflammatory changes centered around the  pancreatic tail. There is a small focus of hypoenhancement in the  pancreatic tail measuring 1 cm (series 3, image [id number]). No  peripancreatic fluid collections. No pancreatic duct dilatation.    SPLEEN: Normal.    ADRENAL GLANDS: Normal.    KIDNEYS/BLADDER: No calculi, hydronephrosis or perinephritic  stranding. No renal masses    BOWEL: No small bowel or colonic obstruction or inflammatory changes.  Normal appendix.    PELVIC ORGANS: Intrauterine device in the central uterus, appears to  be in good position.    ADDITIONAL FINDINGS: Small amount of free fluid in the right paracolic  gutter. No fluid collections or free air. No lymphadenopathy. No  abdominal aortic aneurysm    MUSCULOSKELETAL: Unremarkable.      Impression    IMPRESSION:   1.  Acute pancreatitis. A 1 cm area of hypoenhancement in the  pancreatic tail is favored to be a small amount of fluid or nikolai  edema, and less likely a small focus of pancreatic necrosis. Close  interval follow-up recommended.  2.  Hepatic steatosis.    MARTINE RIGGINS MD         SYSTEM ID:  [id number]     Assessments & Plan (with Medical Decision Making)   Assessment Summary and clinical Impression: 34-year-old female who presented with acute abdominal pain in the left upper quadrant described as a dull discomfort and a sense of fullness in the last 24 hours.  Patient arrived afebrile but was captured to be hypertensive. Arrival blood pressure 170/120.   She was 96% on room.Symptoms were preceded by sore throat.  She has a history of atrial fibrillation not currently on rate control or anticoagulation.  She appeared ill but is nontoxic.  She reported abdominal bloating.  No prior abdominal surgery history of IUD . She was offered medications to manage her pain and discomfort and abdominal imaging with contrast obtained to assess for intra-abdominal catastrophe with history of atrial fibrillation. Workup revealed findings of acute pancreatitis with some hypoenhancement noted in the pancreatic tail on CT.  Rapid strep test was also positive. After reviewing care goals and options for care patient expressed comfort trialing care as an outpatient.  She was discharged with plan for repeat imaging as recommended by radiology with a pain management plan.  She was also placed on amoxicillin for strep pharyngitis given symptoms were preceded by sore throat.  Reviewed concerning symptoms including reasons to return to be reevaluated.    ED course and plan:  Reviewed the medical record.  Visit on 11/30/2023. Prior imaging pelvic ultrasound from 1/27/2020.  Patient was offered medications to manage her discomfort is rated.  Given report of sore throat preceding her left upper quadrant pain.    Rapid strep test obtained and positive.Workup initiated on patient arrival revealed normal liver enzymes.  Lipase 161.  White count 11.5.  Hemoglobin 13.5.  Given history of IUD and erratic periods urinalysis and urine pregnancy obtained.  Normal urinalysis negative urine pregnancy.  CRP 30. With location of pain and discomfort abdominal imaging with contrast was obtained to assess for intra-abdominal catastrophe given underlying history of atrial fibrillation.  Abdominal imaging revealed acute pancreatitis 1cm area of uninvolved pancreatic tail favored to be a small amount of fluid or nikolai edema less likely small focus of pancreatic necrosis.  Hepatic steatosis was also noted.   Follow-up imaging was recommended. Imaging findings reviewed with the patient. After reviewing options for care patient elected to trial of care as an outpatient with the pain management plan.  She was also treated with antibiotics for presumed strep pharyngitis with sore throat preceding abdominal pain with a positive rapid strep test. We reviewed concerning symptoms including reasons to return to be reevaluated.  Patient expressed comfort, understanding and agreement with plan of care.      Disclaimer: This note consists of symbols derived from keyboarding, dictation and/or voice recognition software. As a result, there may be errors in the script that have gone undetected. Please consider this when interpreting information found in this chart.   I have reviewed the nursing notes.    I have reviewed the findings, diagnosis, plan and need for follow up with the patient.           Medical Decision Making  The patient's presentation was of high complexity (acute left upper quadrant pain, history of atrial fibrillation sore throat, ).    The patient's evaluation involved:  ordering and/or review of 2 test(s) in this encounter (laboratory studies, imaging)    The patient's management necessitated high risk (reviewed disposition planning options for care.  Pain management plan, empiric antibiotic).        New Prescriptions    No medications on file       Final diagnoses:   Abdominal pain, left upper quadrant - Maybe related to acute pancreatitis appreciated on imaging    Abnormal CT scan - IMPRESSION:  1.  Acute pancreatitis. A 1 cm area of hypoenhancement in the pancreatic tail is favored to be a small amount of fluid or nikolai edema, and less likely a small focus of pancreatic necrosis. Close interval follow-up recommended. 2.  Hepatic steatosis.   Acute sore throat - Positive strep test       9/17/2024   [facility] EMERGENCY DEPT       Davion Anderson MD  09/17/24 [id number]

## 2024-09-17 NOTE — DISCHARGE INSTRUCTIONS
1) Your patient's rapid strep test was positive and could be a cause for her sore throat that preceded your abdominal pain prior to presenting for care.  Abdominal imaging also revealed concern for pancreatitis with inflammation in the pancreatic tail.  We reviewed follow-up care including repeat imaging to be coordinated by her clinic provider to ensure that your pancreatitis has resolved.  Be sure to schedule follow-up visit with your clinic provider within the next 1 month.  During your visit your blood pressure was also elevated this could be due to infection and inflammation of pancreas.  We have discussed the need to consider follow-up for recheck on your blood pressure especially to ensure you do not have untreated high blood pressure    2) For home offered oxycodone to use for abdominal pain and discomfort if needed.  He also prescribed amoxicillin which is an antibiotic to treat for concern for possible strep pharyngitis.  See handout provided for common and severe side effects of medications provided.    3) If you develop fever, have increasing pain, vomiting or new concerns you should return to be reevaluated

## 2024-09-17 NOTE — ED TRIAGE NOTES
Pt reports LUQ dull pain that started yesterday morning. Pt said yesterday morning she woke up with cold sweats, she took an old amoxicillin and started to feel better, now her stomach hurts again. Pt is nauseated and feels very full. Rates pain 8/10. Hx of Afib does not feel like she is in it right now. Denies chest pain and or SOB. Pt unable to eat because she feels too full.      Triage Assessment (Adult)       Row Name 09/17/24 0836          Triage Assessment    Airway WDL WDL        Respiratory WDL    Respiratory WDL WDL        Cardiac WDL    Cardiac WDL WDL        Cognitive/Neuro/Behavioral WDL    Cognitive/Neuro/Behavioral WDL WDL

## 2025-02-10 ENCOUNTER — HOSPITAL ENCOUNTER (INPATIENT)
Facility: CLINIC | Age: 35
LOS: 2 days | Discharge: HOME | End: 2025-02-13
Attending: EMERGENCY MEDICINE | Admitting: INTERNAL MEDICINE

## 2025-02-10 ENCOUNTER — APPOINTMENT (OUTPATIENT)
Dept: ULTRASOUND IMAGING | Facility: CLINIC | Age: 35
DRG: 439 | End: 2025-02-10
Attending: EMERGENCY MEDICINE

## 2025-02-10 DIAGNOSIS — K85.20 ALCOHOL-INDUCED ACUTE PANCREATITIS, UNSPECIFIED COMPLICATION STATUS: ICD-10-CM

## 2025-02-10 LAB
ALBUMIN SERPL BCG-MCNC: 4.4 G/DL (ref 3.5–5.2)
ALP SERPL-CCNC: 104 U/L (ref 40–150)
ALT SERPL W P-5'-P-CCNC: 15 U/L (ref 0–50)
ANION GAP SERPL CALCULATED.3IONS-SCNC: 16 MMOL/L (ref 7–15)
AST SERPL W P-5'-P-CCNC: 19 U/L (ref 0–45)
BASOPHILS # BLD AUTO: 0 10E3/UL (ref 0–0.2)
BASOPHILS NFR BLD AUTO: 0 %
BILIRUB SERPL-MCNC: 1.7 MG/DL
BUN SERPL-MCNC: 11 MG/DL (ref 6–20)
CALCIUM SERPL-MCNC: 9.8 MG/DL (ref 8.8–10.4)
CHLORIDE SERPL-SCNC: 96 MMOL/L (ref 98–107)
CREAT SERPL-MCNC: 0.81 MG/DL (ref 0.51–0.95)
EGFRCR SERPLBLD CKD-EPI 2021: >90 ML/MIN/1.73M2
EOSINOPHIL # BLD AUTO: 0.1 10E3/UL (ref 0–0.7)
EOSINOPHIL NFR BLD AUTO: 1 %
ERYTHROCYTE [DISTWIDTH] IN BLOOD BY AUTOMATED COUNT: 12.5 % (ref 10–15)
GLUCOSE SERPL-MCNC: 102 MG/DL (ref 70–99)
HCO3 SERPL-SCNC: 24 MMOL/L (ref 22–29)
HCT VFR BLD AUTO: 38.5 % (ref 35–47)
HGB BLD-MCNC: 13.7 G/DL (ref 11.7–15.7)
IMM GRANULOCYTES # BLD: 0.1 10E3/UL
IMM GRANULOCYTES NFR BLD: 1 %
LIPASE SERPL-CCNC: 632 U/L (ref 13–60)
LYMPHOCYTES # BLD AUTO: 2.1 10E3/UL (ref 0.8–5.3)
LYMPHOCYTES NFR BLD AUTO: 21 %
MCH RBC QN AUTO: 34.3 PG (ref 26.5–33)
MCHC RBC AUTO-ENTMCNC: 35.6 G/DL (ref 31.5–36.5)
MCV RBC AUTO: 96 FL (ref 78–100)
MONOCYTES # BLD AUTO: 0.7 10E3/UL (ref 0–1.3)
MONOCYTES NFR BLD AUTO: 6 %
NEUTROPHILS # BLD AUTO: 7.2 10E3/UL (ref 1.6–8.3)
NEUTROPHILS NFR BLD AUTO: 72 %
NRBC # BLD AUTO: 0 10E3/UL
NRBC BLD AUTO-RTO: 0 /100
PLATELET # BLD AUTO: 184 10E3/UL (ref 150–450)
POTASSIUM SERPL-SCNC: 3.8 MMOL/L (ref 3.4–5.3)
PROT SERPL-MCNC: 7.6 G/DL (ref 6.4–8.3)
RBC # BLD AUTO: 4 10E6/UL (ref 3.8–5.2)
SODIUM SERPL-SCNC: 136 MMOL/L (ref 135–145)
WBC # BLD AUTO: 10.1 10E3/UL (ref 4–11)

## 2025-02-10 PROCEDURE — 99222 1ST HOSP IP/OBS MODERATE 55: CPT

## 2025-02-10 PROCEDURE — 85025 COMPLETE CBC W/AUTO DIFF WBC: CPT | Performed by: EMERGENCY MEDICINE

## 2025-02-10 PROCEDURE — 80053 COMPREHEN METABOLIC PANEL: CPT | Performed by: EMERGENCY MEDICINE

## 2025-02-10 PROCEDURE — 99285 EMERGENCY DEPT VISIT HI MDM: CPT | Mod: 25 | Performed by: EMERGENCY MEDICINE

## 2025-02-10 PROCEDURE — 258N000003 HC RX IP 258 OP 636: Performed by: EMERGENCY MEDICINE

## 2025-02-10 PROCEDURE — 76705 ECHO EXAM OF ABDOMEN: CPT

## 2025-02-10 PROCEDURE — 99285 EMERGENCY DEPT VISIT HI MDM: CPT | Performed by: EMERGENCY MEDICINE

## 2025-02-10 PROCEDURE — 83690 ASSAY OF LIPASE: CPT | Performed by: EMERGENCY MEDICINE

## 2025-02-10 PROCEDURE — 96361 HYDRATE IV INFUSION ADD-ON: CPT | Performed by: EMERGENCY MEDICINE

## 2025-02-10 PROCEDURE — 250N000011 HC RX IP 250 OP 636: Mod: JZ | Performed by: EMERGENCY MEDICINE

## 2025-02-10 PROCEDURE — 36415 COLL VENOUS BLD VENIPUNCTURE: CPT | Performed by: EMERGENCY MEDICINE

## 2025-02-10 PROCEDURE — 96376 TX/PRO/DX INJ SAME DRUG ADON: CPT | Performed by: EMERGENCY MEDICINE

## 2025-02-10 PROCEDURE — 96374 THER/PROPH/DIAG INJ IV PUSH: CPT | Mod: 59 | Performed by: EMERGENCY MEDICINE

## 2025-02-10 PROCEDURE — 82465 ASSAY BLD/SERUM CHOLESTEROL: CPT

## 2025-02-10 RX ORDER — MULTIVIT-MIN/FOLIC ACID/BIOTIN 200-300MCG
2 TABLET,CHEWABLE ORAL DAILY
COMMUNITY

## 2025-02-10 RX ORDER — HYDROMORPHONE HYDROCHLORIDE 1 MG/ML
0.5 INJECTION, SOLUTION INTRAMUSCULAR; INTRAVENOUS; SUBCUTANEOUS EVERY 30 MIN PRN
Status: DISCONTINUED | OUTPATIENT
Start: 2025-02-10 | End: 2025-02-11

## 2025-02-10 RX ORDER — ONDANSETRON 2 MG/ML
4 INJECTION INTRAMUSCULAR; INTRAVENOUS EVERY 30 MIN PRN
Status: DISCONTINUED | OUTPATIENT
Start: 2025-02-10 | End: 2025-02-11

## 2025-02-10 RX ADMIN — HYDROMORPHONE HYDROCHLORIDE 0.5 MG: 1 INJECTION, SOLUTION INTRAMUSCULAR; INTRAVENOUS; SUBCUTANEOUS at 21:27

## 2025-02-10 RX ADMIN — SODIUM CHLORIDE, SODIUM LACTATE, POTASSIUM CHLORIDE, AND CALCIUM CHLORIDE 2000 ML: .6; .31; .03; .02 INJECTION, SOLUTION INTRAVENOUS at 21:27

## 2025-02-10 RX ADMIN — HYDROMORPHONE HYDROCHLORIDE 0.5 MG: 1 INJECTION, SOLUTION INTRAMUSCULAR; INTRAVENOUS; SUBCUTANEOUS at 23:40

## 2025-02-10 ASSESSMENT — ACTIVITIES OF DAILY LIVING (ADL)
ADLS_ACUITY_SCORE: 41

## 2025-02-11 ENCOUNTER — APPOINTMENT (OUTPATIENT)
Dept: CT IMAGING | Facility: CLINIC | Age: 35
DRG: 439 | End: 2025-02-11

## 2025-02-11 PROBLEM — F10.90 ALCOHOL USE DISORDER: Status: ACTIVE | Noted: 2025-02-11

## 2025-02-11 PROBLEM — K85.20 ALCOHOL-INDUCED ACUTE PANCREATITIS, UNSPECIFIED COMPLICATION STATUS: Status: ACTIVE | Noted: 2025-02-11

## 2025-02-11 PROBLEM — E78.1 HYPERTRIGLYCERIDEMIA: Status: ACTIVE | Noted: 2025-02-11

## 2025-02-11 LAB
ALBUMIN SERPL BCG-MCNC: 3.6 G/DL (ref 3.5–5.2)
ALP SERPL-CCNC: 98 U/L (ref 40–150)
ALT SERPL W P-5'-P-CCNC: 11 U/L (ref 0–50)
ANION GAP SERPL CALCULATED.3IONS-SCNC: 9 MMOL/L (ref 7–15)
AST SERPL W P-5'-P-CCNC: 13 U/L (ref 0–45)
BASOPHILS # BLD AUTO: 0 10E3/UL (ref 0–0.2)
BASOPHILS NFR BLD AUTO: 0 %
BILIRUB SERPL-MCNC: 1.6 MG/DL
BUN SERPL-MCNC: 8.3 MG/DL (ref 6–20)
CALCIUM SERPL-MCNC: 8.9 MG/DL (ref 8.8–10.4)
CHLORIDE SERPL-SCNC: 99 MMOL/L (ref 98–107)
CHOLEST SERPL-MCNC: 214 MG/DL
CREAT SERPL-MCNC: 0.79 MG/DL (ref 0.51–0.95)
EGFRCR SERPLBLD CKD-EPI 2021: >90 ML/MIN/1.73M2
EOSINOPHIL # BLD AUTO: 0.2 10E3/UL (ref 0–0.7)
EOSINOPHIL NFR BLD AUTO: 2 %
ERYTHROCYTE [DISTWIDTH] IN BLOOD BY AUTOMATED COUNT: 12.4 % (ref 10–15)
GLUCOSE SERPL-MCNC: 86 MG/DL (ref 70–99)
HCO3 SERPL-SCNC: 28 MMOL/L (ref 22–29)
HCT VFR BLD AUTO: 32.7 % (ref 35–47)
HDLC SERPL-MCNC: 70 MG/DL
HGB BLD-MCNC: 11.5 G/DL (ref 11.7–15.7)
IMM GRANULOCYTES # BLD: 0 10E3/UL
IMM GRANULOCYTES NFR BLD: 0 %
INR PPP: 1.1 (ref 0.85–1.15)
LDLC SERPL CALC-MCNC: 78 MG/DL
LIPASE SERPL-CCNC: 381 U/L (ref 13–60)
LYMPHOCYTES # BLD AUTO: 1.9 10E3/UL (ref 0.8–5.3)
LYMPHOCYTES NFR BLD AUTO: 23 %
MCH RBC QN AUTO: 34.1 PG (ref 26.5–33)
MCHC RBC AUTO-ENTMCNC: 35.2 G/DL (ref 31.5–36.5)
MCV RBC AUTO: 97 FL (ref 78–100)
MONOCYTES # BLD AUTO: 0.6 10E3/UL (ref 0–1.3)
MONOCYTES NFR BLD AUTO: 7 %
NEUTROPHILS # BLD AUTO: 5.6 10E3/UL (ref 1.6–8.3)
NEUTROPHILS NFR BLD AUTO: 68 %
NONHDLC SERPL-MCNC: 144 MG/DL
NRBC # BLD AUTO: 0 10E3/UL
NRBC BLD AUTO-RTO: 0 /100
PLATELET # BLD AUTO: 140 10E3/UL (ref 150–450)
POTASSIUM SERPL-SCNC: 3.6 MMOL/L (ref 3.4–5.3)
PROT SERPL-MCNC: 6.5 G/DL (ref 6.4–8.3)
RBC # BLD AUTO: 3.37 10E6/UL (ref 3.8–5.2)
SODIUM SERPL-SCNC: 136 MMOL/L (ref 135–145)
TRIGL SERPL-MCNC: 328 MG/DL
WBC # BLD AUTO: 8.3 10E3/UL (ref 4–11)

## 2025-02-11 PROCEDURE — 85610 PROTHROMBIN TIME: CPT | Performed by: STUDENT IN AN ORGANIZED HEALTH CARE EDUCATION/TRAINING PROGRAM

## 2025-02-11 PROCEDURE — 250N000013 HC RX MED GY IP 250 OP 250 PS 637

## 2025-02-11 PROCEDURE — 120N000001 HC R&B MED SURG/OB

## 2025-02-11 PROCEDURE — 250N000011 HC RX IP 250 OP 636

## 2025-02-11 PROCEDURE — 74177 CT ABD & PELVIS W/CONTRAST: CPT

## 2025-02-11 PROCEDURE — 83690 ASSAY OF LIPASE: CPT | Performed by: STUDENT IN AN ORGANIZED HEALTH CARE EDUCATION/TRAINING PROGRAM

## 2025-02-11 PROCEDURE — 250N000013 HC RX MED GY IP 250 OP 250 PS 637: Performed by: STUDENT IN AN ORGANIZED HEALTH CARE EDUCATION/TRAINING PROGRAM

## 2025-02-11 PROCEDURE — 250N000011 HC RX IP 250 OP 636: Mod: JW

## 2025-02-11 PROCEDURE — 250N000011 HC RX IP 250 OP 636: Mod: JZ | Performed by: STUDENT IN AN ORGANIZED HEALTH CARE EDUCATION/TRAINING PROGRAM

## 2025-02-11 PROCEDURE — 250N000009 HC RX 250

## 2025-02-11 PROCEDURE — 85025 COMPLETE CBC W/AUTO DIFF WBC: CPT | Performed by: STUDENT IN AN ORGANIZED HEALTH CARE EDUCATION/TRAINING PROGRAM

## 2025-02-11 PROCEDURE — 258N000003 HC RX IP 258 OP 636

## 2025-02-11 PROCEDURE — 84295 ASSAY OF SERUM SODIUM: CPT | Performed by: STUDENT IN AN ORGANIZED HEALTH CARE EDUCATION/TRAINING PROGRAM

## 2025-02-11 PROCEDURE — 99232 SBSQ HOSP IP/OBS MODERATE 35: CPT | Performed by: STUDENT IN AN ORGANIZED HEALTH CARE EDUCATION/TRAINING PROGRAM

## 2025-02-11 PROCEDURE — 36415 COLL VENOUS BLD VENIPUNCTURE: CPT | Performed by: STUDENT IN AN ORGANIZED HEALTH CARE EDUCATION/TRAINING PROGRAM

## 2025-02-11 RX ORDER — ACETAMINOPHEN 325 MG/1
650 TABLET ORAL EVERY 6 HOURS
Status: DISCONTINUED | OUTPATIENT
Start: 2025-02-11 | End: 2025-02-13 | Stop reason: HOSPADM

## 2025-02-11 RX ORDER — IOPAMIDOL 755 MG/ML
93 INJECTION, SOLUTION INTRAVASCULAR ONCE
Status: COMPLETED | OUTPATIENT
Start: 2025-02-11 | End: 2025-02-11

## 2025-02-11 RX ORDER — NALOXONE HYDROCHLORIDE 0.4 MG/ML
0.2 INJECTION, SOLUTION INTRAMUSCULAR; INTRAVENOUS; SUBCUTANEOUS
Status: DISCONTINUED | OUTPATIENT
Start: 2025-02-11 | End: 2025-02-13 | Stop reason: HOSPADM

## 2025-02-11 RX ORDER — PROCHLORPERAZINE MALEATE 5 MG/1
10 TABLET ORAL EVERY 6 HOURS PRN
Status: DISCONTINUED | OUTPATIENT
Start: 2025-02-11 | End: 2025-02-13 | Stop reason: HOSPADM

## 2025-02-11 RX ORDER — ACETAMINOPHEN 325 MG/1
650 TABLET ORAL EVERY 4 HOURS PRN
Status: DISCONTINUED | OUTPATIENT
Start: 2025-02-11 | End: 2025-02-13 | Stop reason: HOSPADM

## 2025-02-11 RX ORDER — SODIUM CHLORIDE, SODIUM LACTATE, POTASSIUM CHLORIDE, CALCIUM CHLORIDE 600; 310; 30; 20 MG/100ML; MG/100ML; MG/100ML; MG/100ML
INJECTION, SOLUTION INTRAVENOUS CONTINUOUS
Status: DISCONTINUED | OUTPATIENT
Start: 2025-02-11 | End: 2025-02-13

## 2025-02-11 RX ORDER — HYDROMORPHONE HYDROCHLORIDE 1 MG/ML
0.3 INJECTION, SOLUTION INTRAMUSCULAR; INTRAVENOUS; SUBCUTANEOUS EVERY 4 HOURS PRN
Status: DISCONTINUED | OUTPATIENT
Start: 2025-02-11 | End: 2025-02-11

## 2025-02-11 RX ORDER — ONDANSETRON 2 MG/ML
4 INJECTION INTRAMUSCULAR; INTRAVENOUS EVERY 6 HOURS PRN
Status: DISCONTINUED | OUTPATIENT
Start: 2025-02-11 | End: 2025-02-13 | Stop reason: HOSPADM

## 2025-02-11 RX ORDER — OXYCODONE HYDROCHLORIDE 5 MG/1
5 TABLET ORAL EVERY 4 HOURS PRN
Status: DISCONTINUED | OUTPATIENT
Start: 2025-02-11 | End: 2025-02-13 | Stop reason: HOSPADM

## 2025-02-11 RX ORDER — GABAPENTIN 100 MG/1
200 CAPSULE ORAL 3 TIMES DAILY
Status: DISCONTINUED | OUTPATIENT
Start: 2025-02-11 | End: 2025-02-13 | Stop reason: HOSPADM

## 2025-02-11 RX ORDER — AMOXICILLIN 250 MG
1 CAPSULE ORAL 2 TIMES DAILY PRN
Status: DISCONTINUED | OUTPATIENT
Start: 2025-02-11 | End: 2025-02-13 | Stop reason: HOSPADM

## 2025-02-11 RX ORDER — NALOXONE HYDROCHLORIDE 0.4 MG/ML
0.4 INJECTION, SOLUTION INTRAMUSCULAR; INTRAVENOUS; SUBCUTANEOUS
Status: DISCONTINUED | OUTPATIENT
Start: 2025-02-11 | End: 2025-02-13 | Stop reason: HOSPADM

## 2025-02-11 RX ORDER — HYDROMORPHONE HYDROCHLORIDE 1 MG/ML
0.5 INJECTION, SOLUTION INTRAMUSCULAR; INTRAVENOUS; SUBCUTANEOUS
Status: DISCONTINUED | OUTPATIENT
Start: 2025-02-11 | End: 2025-02-12

## 2025-02-11 RX ORDER — CALCIUM CARBONATE 500 MG/1
1000 TABLET, CHEWABLE ORAL 4 TIMES DAILY PRN
Status: DISCONTINUED | OUTPATIENT
Start: 2025-02-11 | End: 2025-02-13 | Stop reason: HOSPADM

## 2025-02-11 RX ORDER — AMOXICILLIN 250 MG
2 CAPSULE ORAL 2 TIMES DAILY PRN
Status: DISCONTINUED | OUTPATIENT
Start: 2025-02-11 | End: 2025-02-13 | Stop reason: HOSPADM

## 2025-02-11 RX ORDER — ONDANSETRON 4 MG/1
4 TABLET, ORALLY DISINTEGRATING ORAL EVERY 6 HOURS PRN
Status: DISCONTINUED | OUTPATIENT
Start: 2025-02-11 | End: 2025-02-13 | Stop reason: HOSPADM

## 2025-02-11 RX ORDER — LORAZEPAM 2 MG/ML
0.5 INJECTION INTRAMUSCULAR EVERY 4 HOURS PRN
Status: DISCONTINUED | OUTPATIENT
Start: 2025-02-11 | End: 2025-02-13 | Stop reason: HOSPADM

## 2025-02-11 RX ORDER — LIDOCAINE 40 MG/G
CREAM TOPICAL
Status: DISCONTINUED | OUTPATIENT
Start: 2025-02-11 | End: 2025-02-13 | Stop reason: HOSPADM

## 2025-02-11 RX ADMIN — GABAPENTIN 200 MG: 100 CAPSULE ORAL at 10:56

## 2025-02-11 RX ADMIN — ACETAMINOPHEN 650 MG: 325 TABLET, FILM COATED ORAL at 10:56

## 2025-02-11 RX ADMIN — OXYCODONE HYDROCHLORIDE 2.5 MG: 5 TABLET ORAL at 22:20

## 2025-02-11 RX ADMIN — SODIUM CHLORIDE, POTASSIUM CHLORIDE, SODIUM LACTATE AND CALCIUM CHLORIDE: 600; 310; 30; 20 INJECTION, SOLUTION INTRAVENOUS at 16:16

## 2025-02-11 RX ADMIN — ACETAMINOPHEN 650 MG: 325 TABLET, FILM COATED ORAL at 16:20

## 2025-02-11 RX ADMIN — SODIUM CHLORIDE 63 ML: 9 INJECTION, SOLUTION INTRAVENOUS at 01:01

## 2025-02-11 RX ADMIN — HYDROMORPHONE HYDROCHLORIDE 0.5 MG: 1 INJECTION, SOLUTION INTRAMUSCULAR; INTRAVENOUS; SUBCUTANEOUS at 10:55

## 2025-02-11 RX ADMIN — GABAPENTIN 200 MG: 100 CAPSULE ORAL at 16:19

## 2025-02-11 RX ADMIN — HYDROMORPHONE HYDROCHLORIDE 0.5 MG: 1 INJECTION, SOLUTION INTRAMUSCULAR; INTRAVENOUS; SUBCUTANEOUS at 22:21

## 2025-02-11 RX ADMIN — IOPAMIDOL 93 ML: 755 INJECTION, SOLUTION INTRAVENOUS at 01:01

## 2025-02-11 RX ADMIN — OXYCODONE 5 MG: 5 TABLET ORAL at 02:27

## 2025-02-11 RX ADMIN — ACETAMINOPHEN 650 MG: 325 TABLET, FILM COATED ORAL at 22:21

## 2025-02-11 RX ADMIN — SODIUM CHLORIDE, POTASSIUM CHLORIDE, SODIUM LACTATE AND CALCIUM CHLORIDE: 600; 310; 30; 20 INJECTION, SOLUTION INTRAVENOUS at 02:24

## 2025-02-11 RX ADMIN — GABAPENTIN 200 MG: 100 CAPSULE ORAL at 22:21

## 2025-02-11 RX ADMIN — SENNOSIDES AND DOCUSATE SODIUM 1 TABLET: 50; 8.6 TABLET ORAL at 22:20

## 2025-02-11 RX ADMIN — OXYCODONE HYDROCHLORIDE 2.5 MG: 5 TABLET ORAL at 10:55

## 2025-02-11 RX ADMIN — HYDROMORPHONE HYDROCHLORIDE 0.3 MG: 1 INJECTION, SOLUTION INTRAMUSCULAR; INTRAVENOUS; SUBCUTANEOUS at 02:26

## 2025-02-11 ASSESSMENT — ACTIVITIES OF DAILY LIVING (ADL)
FALL_HISTORY_WITHIN_LAST_SIX_MONTHS: NO
ADLS_ACUITY_SCORE: 24
ADLS_ACUITY_SCORE: 41
ADLS_ACUITY_SCORE: 41
WALKING_OR_CLIMBING_STAIRS_DIFFICULTY: NO
ADLS_ACUITY_SCORE: 41
ADLS_ACUITY_SCORE: 23
ADLS_ACUITY_SCORE: 24
ADLS_ACUITY_SCORE: 25
ADLS_ACUITY_SCORE: 24
ADLS_ACUITY_SCORE: 23
DRESSING/BATHING_DIFFICULTY: NO
CONCENTRATING,_REMEMBERING_OR_MAKING_DECISIONS_DIFFICULTY: NO
ADLS_ACUITY_SCORE: 25
ADLS_ACUITY_SCORE: 41
TOILETING_ISSUES: NO
EATING/SWALLOWING: EATING
ADLS_ACUITY_SCORE: 23
ADLS_ACUITY_SCORE: 25
ADLS_ACUITY_SCORE: 24
DIFFICULTY_COMMUNICATING: NO
WEAR_GLASSES_OR_BLIND: NO
HEARING_DIFFICULTY_OR_DEAF: NO
ADLS_ACUITY_SCORE: 25
ADLS_ACUITY_SCORE: 23
ADLS_ACUITY_SCORE: 24
ADLS_ACUITY_SCORE: 25
ADLS_ACUITY_SCORE: 24
ADLS_ACUITY_SCORE: 24
CHANGE_IN_FUNCTIONAL_STATUS_SINCE_ONSET_OF_CURRENT_ILLNESS/INJURY: YES
ADLS_ACUITY_SCORE: 41
DOING_ERRANDS_INDEPENDENTLY_DIFFICULTY: YES
ADLS_ACUITY_SCORE: 23
ADLS_ACUITY_SCORE: 41
DIFFICULTY_EATING/SWALLOWING: YES

## 2025-02-11 NOTE — PLAN OF CARE
Alert and oriented patient receiving Tylenol, oxycodone and dilaudid for abdominal pain.   Patient states pain still present, but improving from yesterday.  Lipase improved today. Diet advanced to clear liquid, low fat. Patient tolerating few sips, states poor appetite no nausea. IV infusing.       Goal Outcome Evaluation:  Problem: Adult Inpatient Plan of Care  Goal: Plan of Care Review  2/11/2025 1141 by Shantell Rashid RN  Outcome: Progressing  Flowsheets (Taken 2/11/2025 1141)  Plan of Care Reviewed With: patient  Overall Patient Progress: improving  Goal: Absence of Hospital-Acquired Illness or Injury  Intervention: Prevent Skin Injury  Recent Flowsheet Documentation  Taken 2/11/2025 1056 by Shantell Rashid RN  Body Position: position changed independently     Problem: Pancreatitis  Goal: Effective Oxygenation and Ventilation  Intervention: Optimize Oxygenation and Ventilation  Recent Flowsheet Documentation  Taken 2/11/2025 1056 by Shantell Rashid, RN  Activity Management: up ad chanel  Head of Bed (HOB) Positioning: HOB at 30-45 degrees, oxygen sats 94%RA

## 2025-02-11 NOTE — H&P
"[facility]    History and Physical - Hospitalist Service       Date of Admission:  2/10/2025    Assessment & Plan    Felisha Zapata is a 34 year old female with past medical hx of afib, tobacco use disorder admitted on 2/10/2025. She presented for LUQ pain similar to her prior episode of alcohol induced pancreatitis.     Alcohol induced pancreatitis  Anion gap elevation, likely due to starvation ketosis    Hx of alcoholic pancreatitis 9/2024 treated as outpatient. CT at that time suggested possible pancreatic necrosis and recommended a follow up CT, which has not been obtained.     Her father passed away 1/30, and she has been drinking alcohol heavily since then, last drink 2/7 pm. She presented with LUQ pain, poor PO intake, no bowel movement, no flatus, nausea since 2/8. She had one episode of emesis 2/11. She states this feels similar to her previous episode of pancreatitis.    She presented with a lipase of 632, anion gap 16, Tbili 1.7. RUQ U/S unremarkable.     - CT a/p pending to re-evaluate possible area of pancreatic necrosis and also to evaluate for possible SBO  - NPO  - LR 150ml/hr    Atrial fibrillation, paroxysmal  Hx of afib s/p cardioversion in 2013. Not on anticoagulation. CHADsVASc 1.          Diet: NPO for Medical/Clinical Reasons Except for: Meds, Ice Chips  DVT Prophylaxis: Ambulate every shift  Judge Catheter: Not present  Lines: None     Cardiac Monitoring: None  Code Status: Full Code    Clinically Significant Risk Factors Present on Admission          # Hypochloremia: Lowest Cl = 96 mmol/L in last 2 days, will monitor as appropriate                    # Overweight: Estimated body mass index is 29.76 kg/m  as calculated from the following:    Height as of this encounter: 1.702 m (5' 7\").    Weight as of this encounter: 86.2 kg (190 lb).              Disposition Plan     Medically Ready for Discharge: Anticipated in 2-4 Days         The patient's care was " discussed with the Attending Physician, Dr. Heck and Patient.    Buddy Lucas PA-C  Hospitalist Service  [facility]  Securely message with [employer] (more info)  Text page via [facility] Paging/Directory     ______________________________________________________________________    Chief Complaint   Abdominal pain    History is obtained from the patient    History of Present Illness   Felisha Zapata is a 34 year old female who presented for abdominal pain.    Patient presented for left upper quadrant abdominal pain that started 3 days ago.  She states that it feels similar to her episode of pancreatitis in September.  This was treated outpatient.  She reports that her father passed away 10 days ago, and she has been on a iniguez with alcohol since then.  She started drinking Thursday night, 10 days ago, and stopped drinking Friday night, 4 days ago.  Her pain started Saturday morning, the next day.  She states that she recently flew back from [place] and drove straight to the hospital.  She also reports that she has not been able to eat or drink much since Saturday, and had 1 episode of vomiting today.  She also reports no bowel movement since Saturday and not being able to pass gas.      Past Medical History    Past Medical History:   Diagnosis Date    Abnormal Pap smear of cervix 2007 2008, 2018, 2020, 2/3/21    Cervical high risk HPV (human papillomavirus) test positive 2008 2011, 2014, 2015, 6/22/16, 10/16/18, 2020, 2/3/21    H/O colposcopy with cervical biopsy 02/2008    SANA 3    H/O colposcopy with cervical biopsy 09/2014    Negative    History of colposcopy 08/09/2016    ECC - negative    IUD (intrauterine device) in place 2020    Tobacco use disorder     Smoker       Past Surgical History   Past Surgical History:   Procedure Laterality Date    ANESTHESIA CARDIOVERSION  2/18/2013    Procedure: ANESTHESIA CARDIOVERSION;  Cardioversion;  Surgeon: Provider, Generic  Anesthesia;  Location: [facility] OR    LEEP TX, CERVICAL  2008    SANA 3       Prior to Admission Medications   Prior to Admission Medications   Prescriptions Last Dose Informant Patient Reported? Taking?   Ascorbic Acid (VITAMIN C ADULT GUMMIES PO) Past Month Morning Self Yes Yes   Sig: Take 2 chew tab by mouth daily.   BIOTIN PO Past Month Morning Self Yes Yes   Sig: Take 2 chew tab by mouth daily.   Multiple Vitamins-Minerals (WOMENS MULTI GUMMIES) CHEW Past Month Morning Self Yes Yes   Sig: Take 2 chew tab by mouth daily.   levonorgestrel (MIRENA) 20 MCG/24HR IUD  Self Yes No   Si each (20 mcg) by Intrauterine route once      Facility-Administered Medications: None       UPDATE: these sections are not required for  billing, but can be added when medically relevant     Physical Exam   Vital Signs: Temp: 98.4  F (36.9  C) Temp src: Oral BP: (!) 160/118 Pulse: 106   Resp: 18 SpO2: 98 % O2 Device: None (Room air)    Weight: 190 lbs 0 oz    Constitutional: Alert, oriented, cooperative, in no acute distress, appears nontoxic.  HENT: Oropharynx is clear and moist. No evidence of cranial trauma. Normocephalic. Eyes anicteric.   Cardiovascular: Regular rate and rhythm, normal S1 and S2, and no murmur noted. No lower extremity edema.  Respiratory: Clear to auscultation bilaterally with no adventitious breath sounds.   GI: Soft, non-tender, dull to percussion, high pitched bowel sounds, no hepatosplenomegaly, no masses appreciated.  Musculoskeletal: Normal muscle bulk and tone. FROM in all extremities   Skin: Warm and dry, no rashes on exposed skin.   Psych: Normal affect and speech.  Neurologic: Cranial nerves 2-12 are grossly intact.       Medical Decision Making       65 MINUTES SPENT BY ME on the date of service doing chart review, history, exam, documentation & further activities per the note.      Data     I have personally reviewed the following data over the past 24 hrs:    10.1  \   13.7   / 184     136 96 (L)  11.0 /  102 (H)   3.8 24 0.81 \     ALT: 15 AST: 19 AP: 104 TBILI: 1.7 (H)   ALB: 4.4 TOT PROTEIN: 7.6 LIPASE: 632 (H)       Imaging results reviewed over the past 24 hrs:   Recent Results (from the past 24 hours)   US Abdomen Limited    Narrative    EXAM: US ABDOMEN LIMITED  LOCATION: [facility]  DATE: 2/10/2025    INDICATION: pancreatitis, please eval for cholelithiasis choledochlithiasis  COMPARISON: CT abdomen and pelvis 10/17/2024  TECHNIQUE: Limited abdominal ultrasound.    FINDINGS:    GALLBLADDER: Normal. No gallstones, wall thickening, or pericholecystic fluid. Negative sonographic Jama's sign.    BILE DUCTS: No biliary dilatation. The common duct measures 5 mm.    LIVER: Echogenic parenchyma with smooth contour suggesting mild fatty infiltration. No focal mass.     RIGHT KIDNEY: No hydronephrosis. 5 mm echogenic lesion upper pole right kidney.    PANCREAS: The visualized portions are normal.    No ascites.      Impression    IMPRESSION:  1.  No evidence for gallstones or cholecystitis.  2.  No bile duct dilatation.  3.  5 mm echogenic lesion upper pole right kidney could represent a nonobstructing stone.

## 2025-02-11 NOTE — ED PROVIDER NOTES
History     Chief Complaint   Patient presents with    Abdominal Pain     HPI  Felisha Zapata is a 34 year old female with reported history of pancreatitis with left upper quadrant abdominal pain in setting of heavy alcohol use in context of family loss.  Feels similar to prior episodes of pancreatitis.  Tried clear liquid diet and hope that this would improve her symptoms.  Still having nausea and not tolerating oral fluids well.  Was in [place] where her dad was.  Just got back to [place] yesterday.  Last drink 3 days ago.  No hematemesis.  No bowel movement for 2 to 3 days.    The patient's PMHx, Surgical Hx, Allergies, and Medications were all reviewed with the patient.    Allergies:  No Known Allergies    Problem List:    Patient Active Problem List    Diagnosis Date Noted    mirena IUD 2/10/2022 02/10/2022     Priority: Medium     Mirena IUD placed 2/10/2022  LOT# [id number]  Exp: 04/2024  NDC# 72497-740-77    Rena Delgado on 2/10/2022 at 11:33 AM          Atrial fibrillation (H) 02/17/2013     Priority: Medium    CARDIOVASCULAR SCREENING; LDL GOAL LESS THAN 160 10/31/2010     Priority: Medium    Severe dysplasia of cervix (SANA III) 10/10/2007     Priority: Medium     10/1/07 LSIL. Rec colpo.  1/3/08 LSIL, + HPV 58. Rec Colpo  2/11/08 LSIL. Colpo-SANA 3. Rec LEEP.  4/24/08 LEEP SANA 3, + endocervical margins.   4/15/09 NIL. Repeat pap 1 year. Needs yearly paps for 20 years (2028).  1/20/10 NIL. Repeat pap 1 year.  10/27/11 NIL Pap, + HR HPV. (Care Everywhere)  4/4/13 NIL. Pap in 1 year.  9/5/14 NIL, +HR HPV type 16. Plan colp  9/19/14 [place] - Negative for dysplasia. Plan cotest in 1 year.    10/1/2015 NIL, +HR HPV type 16. Plan [place]-  1/6/16: [place] not done. Tracking updated for 6 mo pap.   6/22/16 NIL Pap, + HR HPV 16. Plan colp  8/9/16 [place] ECC - negative. Plan cotest in 1 year.   10/16/18 ASCUS Pap, + HR HPV 16 (neg 18 & other). Plan colp  6/7/19 Lost to follow-up for pap tracking  2/13/20 ASCUS  pap, + HR HPV 16. Plan: colpo  10/07/20 Patient is lost to pap tracking follow-up.   2/3/21 ASCUS Pap, + HR HPV 16 (neg 18 & other). [place] due by 5/3/21  8/20/21 Lost to follow-up for pap tracking  12/23/21 [place] ECC - SANA 2-3. LEEP due by 3/23/2022.  2/10/22 LEEP SANA 2-3, extending to margins, ECC minute fragment suspicious for HSIL. Plan: cotest in 6 months  3/24/23 Lost to follow-up for pap tracking       BMI 30.0-30.9,adult 10/16/2006     Priority: Medium    Tobacco use disorder 05/09/2005     Priority: Medium        Past Medical History:    Past Medical History:   Diagnosis Date    Abnormal Pap smear of cervix 2007    Cervical high risk HPV (human papillomavirus) test positive 2008    H/O colposcopy with cervical biopsy 02/2008    H/O colposcopy with cervical biopsy 09/2014    History of colposcopy 08/09/2016    IUD (intrauterine device) in place 2020    Tobacco use disorder        Past Surgical History:    Past Surgical History:   Procedure Laterality Date    ANESTHESIA CARDIOVERSION  2/18/2013    Procedure: ANESTHESIA CARDIOVERSION;  Cardioversion;  Surgeon: Provider, Generic Anesthesia;  Location: [facility] OR    LEEP TX, CERVICAL  4/2008    SANA 3       Family History:    Family History   Problem Relation Age of Onset    Eye Disorder Paternal Grandmother         glaucoma    Hypertension Paternal Grandfather     Connective Tissue Disorder Sister         psoriasis    Alcohol/Drug Mother     Depression Father        Social History:  Marital Status:  Single [1]  Social History     Tobacco Use    Smoking status: Former     Current packs/day: 0.00     Average packs/day: 0.5 packs/day for 11.0 years (5.5 ttl pk-yrs)     Types: Cigarettes     Start date: 1/13/2014     Quit date: 11/25/2021     Years since quitting: 3.2    Smokeless tobacco: Never    Tobacco comments:     quit on 11/26/2021   Vaping Use    Vaping status: Never Used   Substance Use Topics    Alcohol use: Yes     Alcohol/week: 0.0 standard drinks of alcohol      "Comment: mixed 4-10 a week    Drug use: No     Comment: ho marijuana use and \"dabbling in coke and meth\" during high school, clean >4 yrs        Medications:    Ascorbic Acid (VITAMIN C ADULT GUMMIES PO)  BIOTIN PO  Multiple Vitamins-Minerals (WOMENS MULTI GUMMIES) CHEW  levonorgestrel (MIRENA) 20 MCG/24HR IUD          Review of Systems  Pertinent positives and negatives mentioned in HPI    Physical Exam   BP: (!) 171/128  Pulse: 107  Temp: 98.4  F (36.9  C)  Resp: 18  Height: 170.2 cm (5' 7\")  Weight: 86.2 kg (190 lb)  SpO2: 97 %    GEN: Awake, alert, and cooperative.  Appears distressed but nontoxic  CV : Extremities warm and well-perfused  PULM: Normal effort. No wheezes, rales, or rhonchi bilaterally.  ABD: Epigastric tenderness to palpation.  Soft and nondistended.  No rebound or guarding.   INT: Warm. No diaphoresis. Normal color.        ED Course        Procedures                 Critical Care time:  none              Results for orders placed or performed during the hospital encounter of 02/10/25 (from the past 24 hours)   CBC with platelets, differential    Narrative    The following orders were created for panel order CBC with platelets, differential.  Procedure                               Abnormality         Status                     ---------                               -----------         ------                     CBC with platelets and d...[[id number]]  Abnormal            Final result                 Please view results for these tests on the individual orders.   Comprehensive metabolic panel   Result Value Ref Range    Sodium 136 135 - 145 mmol/L    Potassium 3.8 3.4 - 5.3 mmol/L    Carbon Dioxide (CO2) 24 22 - 29 mmol/L    Anion Gap 16 (H) 7 - 15 mmol/L    Urea Nitrogen 11.0 6.0 - 20.0 mg/dL    Creatinine 0.81 0.51 - 0.95 mg/dL    GFR Estimate >90 >60 mL/min/1.73m2    Calcium 9.8 8.8 - 10.4 mg/dL    Chloride 96 (L) 98 - 107 mmol/L    Glucose 102 (H) 70 - 99 mg/dL    Alkaline Phosphatase 104 40 " - 150 U/L    AST 19 0 - 45 U/L    ALT 15 0 - 50 U/L    Protein Total 7.6 6.4 - 8.3 g/dL    Albumin 4.4 3.5 - 5.2 g/dL    Bilirubin Total 1.7 (H) <=1.2 mg/dL   Lipase   Result Value Ref Range    Lipase 632 (H) 13 - 60 U/L   CBC with platelets and differential   Result Value Ref Range    WBC Count 10.1 4.0 - 11.0 10e3/uL    RBC Count 4.00 3.80 - 5.20 10e6/uL    Hemoglobin 13.7 11.7 - 15.7 g/dL    Hematocrit 38.5 35.0 - 47.0 %    MCV 96 78 - 100 fL    MCH 34.3 (H) 26.5 - 33.0 pg    MCHC 35.6 31.5 - 36.5 g/dL    RDW 12.5 10.0 - 15.0 %    Platelet Count 184 150 - 450 10e3/uL    % Neutrophils 72 %    % Lymphocytes 21 %    % Monocytes 6 %    % Eosinophils 1 %    % Basophils 0 %    % Immature Granulocytes 1 %    NRBCs per 100 WBC 0 <1 /100    Absolute Neutrophils 7.2 1.6 - 8.3 10e3/uL    Absolute Lymphocytes 2.1 0.8 - 5.3 10e3/uL    Absolute Monocytes 0.7 0.0 - 1.3 10e3/uL    Absolute Eosinophils 0.1 0.0 - 0.7 10e3/uL    Absolute Basophils 0.0 0.0 - 0.2 10e3/uL    Absolute Immature Granulocytes 0.1 <=0.4 10e3/uL    Absolute NRBCs 0.0 10e3/uL   US Abdomen Limited    Narrative    EXAM: US ABDOMEN LIMITED  LOCATION: [facility]  DATE: 2/10/2025    INDICATION: pancreatitis, please eval for cholelithiasis choledochlithiasis  COMPARISON: CT abdomen and pelvis 10/17/2024  TECHNIQUE: Limited abdominal ultrasound.    FINDINGS:    GALLBLADDER: Normal. No gallstones, wall thickening, or pericholecystic fluid. Negative sonographic Jama's sign.    BILE DUCTS: No biliary dilatation. The common duct measures 5 mm.    LIVER: Echogenic parenchyma with smooth contour suggesting mild fatty infiltration. No focal mass.     RIGHT KIDNEY: No hydronephrosis. 5 mm echogenic lesion upper pole right kidney.    PANCREAS: The visualized portions are normal.    No ascites.      Impression    IMPRESSION:  1.  No evidence for gallstones or cholecystitis.  2.  No bile duct dilatation.  3.  5 mm echogenic lesion upper pole  right kidney could represent a nonobstructing stone.           Medications   ondansetron (ZOFRAN) injection 4 mg (has no administration in time range)   HYDROmorphone (PF) (DILAUDID) injection 0.5 mg (0.5 mg Intravenous $Given 2/10/25 4070)   lactated ringers BOLUS 2,000 mL (0 mLs Intravenous Stopped 2/11/25 0006)       Assessments & Plan (with Medical Decision Making)   34 year old female with past medical history of pancreatitis with epigastric abdominal pain associate with nausea and decreased p.o. intake in setting of recent alcohol binge after passing of her father.  Hypertensive and epigastric tenderness to palpation.  CBC grossly normal.  CMP with normal renal and hepatic function.  Total bili modestly elevated 1.7.  Lipase elevated at 632 consistent with pancreatitis.  For provide ultrasound without any evidence of cholelithiasis or biliary dilatation.  Patient was given 2 L lactated ringer solution in emergency department 0.5 mg aliquots of IV hydromorphone and ondansetron for nausea.     Case discussed with Buddy Dinh with the hospitalist service who accepted admission.     I have reviewed the nursing notes.         New Prescriptions    No medications on file       Final diagnoses:   Alcohol-induced acute pancreatitis, unspecified complication status     Olivier Morejon MD        2/10/2025   [facility] EMERGENCY DEPT    Disclaimer: This note consists of words and symbols derived from keyboarding and dictation using voice recognition software.  As a result, there may be errors that have gone undetected.  Please consider this when interpreting information found in this note.               Olivier Morejon MD  02/11/25 0025

## 2025-02-11 NOTE — PROGRESS NOTES
"[facility] ADMISSION NOTE    Patient admitted to room 2407 at approximately 0615 via wheel chair from emergency room. Patient was accompanied by transport tech.     Verbal SBAR report received from YOKO Landin prior to patient arrival.     Patient ambulated to bed independently. Patient alert and oriented X 3. Pain is controlled with current analgesics.  Medication(s) being used: narcotic analgesics including hydromorphone (Dilaudid) and oxycodone (Oxycontin, Oxyir).  . Admission vital signs: Blood pressure (!) 161/100, pulse 84, temperature 97.8  F (36.6  C), temperature source Oral, resp. rate 18, height 1.702 m (5' 7\"), weight 86.9 kg (191 lb 9.3 oz), SpO2 95%, not currently breastfeeding. Patient was oriented to plan of care, call light, bed controls, tv, telephone, bathroom, and visiting hours.     Risk Assessment    The following safety risks were identified during admission: none. Yellow risk band applied: NO.     Skin Initial Assessment    This writer admitted this patient and completed a full skin assessment and Lonny score in the Adult PCS flowsheet.   Photo documentation of skin problem and/or wound competed via [employer] application (located under Media):  N/A    Appropriate interventions initiated as needed.     Secondary skin check completed by Refused.         Education    Patient has a [place] to Observation order: No  Observation education completed and documented: N/A      Taylor Nguyen RN      "

## 2025-02-11 NOTE — ED TRIAGE NOTES
3 days of LUQ abdominal pain. Endorses nausea and vomiting. Feels like her pancreatitis in the past which is induced by alcohol.Dad recently passed away and has gone on a iniguez, last drink Friday night.      Triage Assessment (Adult)       Row Name 02/10/25 1920          Triage Assessment    Airway WDL WDL        Respiratory WDL    Respiratory WDL WDL        Skin Circulation/Temperature WDL    Skin Circulation/Temperature WDL WDL        Cardiac WDL    Cardiac WDL WDL        Peripheral/Neurovascular WDL    Peripheral Neurovascular WDL WDL        Cognitive/Neuro/Behavioral WDL    Cognitive/Neuro/Behavioral WDL WDL

## 2025-02-11 NOTE — PROGRESS NOTES
[facility]    Medicine Progress Note - Hospitalist Service    Date of Admission:  2/10/2025    Assessment & Plan    Felisha Zapata is a 34 year old female with past medical hx of afib, tobacco use disorder admitted on 2/10/2025. She presented for LUQ pain similar to her prior episode of alcohol induced pancreatitis.     Alcohol induced pancreatitis  Anion gap elevation, likely due to starvation ketosis    Hx of alcoholic pancreatitis 9/2024 treated as outpatient. CT at that time suggested possible pancreatic necrosis and recommended a follow up CT, which has not been obtained.     Her father passed away 1/30, and she has been drinking alcohol heavily since then, last drink 2/7 pm. She presented with LUQ pain, poor PO intake, no bowel movement, no flatus, nausea since 2/8. She had one episode of emesis 2/11. She states this feels similar to her previous episode of pancreatitis. She presented with a lipase of 632, anion gap 16, Tbili 1.7. RUQ U/S unremarkable. CT a/p with evidence of acute interstitial pancreatitis without evidence of complications or fluid collections.  Lipase improved 381.  - Tylenol 650 mg every 6 hours, gabapentin 200 mg 3 times daily, oxycodone as needed, and Dilaudid as needed  - Liquid diet, advance as tolerated  - LR 150ml/hr    Normocytic anemia  Thrombocytopenia  Patient with hemoglobin 13.7 on presentation and drifted to 11.5 with IVF.  Platelets 184 on presentation and drifted to 140.  Likely component of dilution given degree of poor intake for last couple days prior to admission.  - Continue to monitor    Atrial fibrillation, paroxysmal  Hx of afib s/p cardioversion in 2013. Not on anticoagulation. CHADsVASc 1.    Hyperlipidemia  Obesity  Cholesterol 214, triglycerides 328, LDL 70.  Triglycerides not in a range that would typically be considered to trigger for pancreatitis.  Likely some underlying metabolic syndrome        Diet: Advance Diet as  "Tolerated: Clear Liquid Diet; Low Fat Diet    DVT Prophylaxis: Low Risk/Ambulatory with no VTE prophylaxis indicated  Judge Catheter: Not present  Lines: None     Cardiac Monitoring: None  Code Status: Full Code      Clinically Significant Risk Factors Present on Admission          # Hypochloremia: Lowest Cl = 96 mmol/L in last 2 days, will monitor as appropriate                    # Obesity: Estimated body mass index is 30.01 kg/m  as calculated from the following:    Height as of this encounter: 1.702 m (5' 7\").    Weight as of this encounter: 86.9 kg (191 lb 9.3 oz).              Social Drivers of Health    Tobacco Use: Medium Risk (2/10/2022)    Patient History     Smoking Tobacco Use: Former     Smokeless Tobacco Use: Never          Disposition Plan     Medically Ready for Discharge: Anticipated Tomorrow             Geremias Dao DO  Hospitalist Service  [facility]  Securely message with [employer] (more info)  Text page via [employer] Paging/Directory   ______________________________________________________________________    Interval History   No acute events overnight.  Patient reports improving pain, however had worsening pain with broth.  Having sips of fluids but still having recurrent abdominal pain.  Discussed continuing pain medications and IVF.  Will advance diet as tolerated.    Physical Exam   Vital Signs: Temp: 99.4  F (37.4  C) Temp src: Oral BP: (!) 147/106 Pulse: 94   Resp: 18 SpO2: 94 % O2 Device: None (Room air)    Weight: 191 lbs 9.28 oz    Constitutional: awake, alert, cooperative, no apparent distress, and appears stated age  Respiratory: No increased work of breathing, good air exchange, clear to auscultation bilaterally, no crackles or wheezing  Cardiovascular: Normal apical impulse, regular rate and rhythm, normal S1 and S2, no S3 or S4, and no murmur noted  GI: Pain to deep palpation periumbilically, no peritoneal signs, bowel sounds present  Skin: normal skin color, " texture, turgor  Musculoskeletal: There is no redness, warmth, or swelling of the joints.  Full range of motion noted.  Motor strength is 5 out of 5 all extremities bilaterally.  Tone is normal.    Medical Decision Making       40 MINUTES SPENT BY ME on the date of service doing chart review, history, exam, documentation & further activities per the note.      Data     I have personally reviewed the following data over the past 24 hrs:    8.3  \   11.5 (L)   / 140 (L)     136 99 8.3 /  86   3.6 28 0.79 \     ALT: 11 AST: 13 AP: 98 TBILI: 1.6 (H)   ALB: 3.6 TOT PROTEIN: 6.5 LIPASE: 381 (H)     INR:  1.10 PTT:  N/A   D-dimer:  N/A Fibrinogen:  N/A       Imaging results reviewed over the past 24 hrs:   Recent Results (from the past 24 hours)   US Abdomen Limited    Narrative    EXAM: US ABDOMEN LIMITED  LOCATION: [facility]  DATE: 2/10/2025    INDICATION: pancreatitis, please eval for cholelithiasis choledochlithiasis  COMPARISON: CT abdomen and pelvis 10/17/2024  TECHNIQUE: Limited abdominal ultrasound.    FINDINGS:    GALLBLADDER: Normal. No gallstones, wall thickening, or pericholecystic fluid. Negative sonographic Jama's sign.    BILE DUCTS: No biliary dilatation. The common duct measures 5 mm.    LIVER: Echogenic parenchyma with smooth contour suggesting mild fatty infiltration. No focal mass.     RIGHT KIDNEY: No hydronephrosis. 5 mm echogenic lesion upper pole right kidney.    PANCREAS: The visualized portions are normal.    No ascites.      Impression    IMPRESSION:  1.  No evidence for gallstones or cholecystitis.  2.  No bile duct dilatation.  3.  5 mm echogenic lesion upper pole right kidney could represent a nonobstructing stone.       CT Abdomen Pelvis w Contrast    Narrative    EXAM: CT ABDOMEN PELVIS W CONTRAST  LOCATION: [facility]  DATE: 2/11/2025    INDICATION: Pancreatitis. Hx of pancreatitis with possible necrosis but did not follow up. No BM  or flatus for 3 days, eval for SBO  COMPARISON: Abdominal ultrasound 02/10/2025 and CT abdomen and pelvis 09/17/2024  TECHNIQUE: CT scan of the abdomen and pelvis was performed following injection of IV contrast. Multiplanar reformats were obtained. Dose reduction techniques were used.  CONTRAST: 93 mL Isovue 370    FINDINGS:   LOWER CHEST: Normal.    HEPATOBILIARY: Mild fatty infiltration of the liver.    PANCREAS: Inflammatory changes involving the head, body, and tail of the pancreas with edema extending into the adjacent mesentery. No evidence for pancreatic necrosis. No localized fluid collections. The splenic vein is patent.    SPLEEN: Trace ascites adjacent to the spleen.    ADRENAL GLANDS: Normal.    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 No renal calculi or hydronephrosis.    BOWEL: Normal appendix. No evidence for bowel obstruction. A few scattered colonic diverticula.    LYMPH NODES: Normal.    VASCULATURE: Normal.    PELVIC ORGANS: Small amount of free pelvic fluid. IUD in the uterus.    MUSCULOSKELETAL: Normal.      Impression    IMPRESSION:   1.  Acute interstitial pancreatitis. No evidence for complications.  2.  Small amount of ascites adjacent to the spleen and in the pelvis.  3.  Cortical scarring and malrotation right kidney. No stones or hydronephrosis.

## 2025-02-11 NOTE — MEDICATION SCRIBE - ADMISSION MEDICATION HISTORY
Medication Scribe Admission Medication History    Admission medication history is complete. The information provided in this note is only as accurate as the sources available at the time of the update.    Information Source(s): Patient and CareEverywhere/SureScripts via with patient in room and finished at desk.    Pertinent Information:   She has not taken her supplements as she was in [place] for 1.5 weeks.    Changes made to PTA medication list:  Added:   Ascorbic Acid  Biotin  MVI    Deleted:   Ciprofloxacin 500 mg  Old Mirena entry  Oxycodone 5 mg    Changed: None    Allergies reviewed with patient and updates made in EHR: yes, no allergies.    Medication History Completed By: Juani Root 2/10/2025 8:57 PM    PTA Med List   Medication Sig Last Dose/Taking    Ascorbic Acid (VITAMIN C ADULT GUMMIES PO) Take 2 chew tab by mouth daily. Past Month Morning    BIOTIN PO Take 2 chew tab by mouth daily. Past Month Morning    Multiple Vitamins-Minerals (WOMENS MULTI GUMMIES) CHEW Take 2 chew tab by mouth daily. Past Month Morning

## 2025-02-12 LAB
ANION GAP SERPL CALCULATED.3IONS-SCNC: 13 MMOL/L (ref 7–15)
BUN SERPL-MCNC: 4.8 MG/DL (ref 6–20)
CALCIUM SERPL-MCNC: 9.3 MG/DL (ref 8.8–10.4)
CHLORIDE SERPL-SCNC: 99 MMOL/L (ref 98–107)
CREAT SERPL-MCNC: 0.65 MG/DL (ref 0.51–0.95)
EGFRCR SERPLBLD CKD-EPI 2021: >90 ML/MIN/1.73M2
ERYTHROCYTE [DISTWIDTH] IN BLOOD BY AUTOMATED COUNT: 12.4 % (ref 10–15)
FERRITIN SERPL-MCNC: 563 NG/ML (ref 6–175)
GLUCOSE SERPL-MCNC: 97 MG/DL (ref 70–99)
HCO3 SERPL-SCNC: 27 MMOL/L (ref 22–29)
HCT VFR BLD AUTO: 29.3 % (ref 35–47)
HGB BLD-MCNC: 10.1 G/DL (ref 11.7–15.7)
IRON BINDING CAPACITY (ROCHE): 260 UG/DL (ref 240–430)
IRON SATN MFR SERPL: 8 % (ref 15–46)
IRON SERPL-MCNC: 20 UG/DL (ref 37–145)
LIPASE SERPL-CCNC: 223 U/L (ref 13–60)
MCH RBC QN AUTO: 33.9 PG (ref 26.5–33)
MCHC RBC AUTO-ENTMCNC: 34.5 G/DL (ref 31.5–36.5)
MCV RBC AUTO: 98 FL (ref 78–100)
PLATELET # BLD AUTO: 141 10E3/UL (ref 150–450)
POTASSIUM SERPL-SCNC: 3.8 MMOL/L (ref 3.4–5.3)
RBC # BLD AUTO: 2.98 10E6/UL (ref 3.8–5.2)
SODIUM SERPL-SCNC: 139 MMOL/L (ref 135–145)
WBC # BLD AUTO: 7.1 10E3/UL (ref 4–11)

## 2025-02-12 PROCEDURE — 82310 ASSAY OF CALCIUM: CPT | Performed by: STUDENT IN AN ORGANIZED HEALTH CARE EDUCATION/TRAINING PROGRAM

## 2025-02-12 PROCEDURE — 250N000013 HC RX MED GY IP 250 OP 250 PS 637: Performed by: STUDENT IN AN ORGANIZED HEALTH CARE EDUCATION/TRAINING PROGRAM

## 2025-02-12 PROCEDURE — 85027 COMPLETE CBC AUTOMATED: CPT | Performed by: STUDENT IN AN ORGANIZED HEALTH CARE EDUCATION/TRAINING PROGRAM

## 2025-02-12 PROCEDURE — 36415 COLL VENOUS BLD VENIPUNCTURE: CPT | Performed by: STUDENT IN AN ORGANIZED HEALTH CARE EDUCATION/TRAINING PROGRAM

## 2025-02-12 PROCEDURE — 82728 ASSAY OF FERRITIN: CPT | Performed by: STUDENT IN AN ORGANIZED HEALTH CARE EDUCATION/TRAINING PROGRAM

## 2025-02-12 PROCEDURE — 250N000013 HC RX MED GY IP 250 OP 250 PS 637

## 2025-02-12 PROCEDURE — 83540 ASSAY OF IRON: CPT | Performed by: STUDENT IN AN ORGANIZED HEALTH CARE EDUCATION/TRAINING PROGRAM

## 2025-02-12 PROCEDURE — 83690 ASSAY OF LIPASE: CPT | Performed by: STUDENT IN AN ORGANIZED HEALTH CARE EDUCATION/TRAINING PROGRAM

## 2025-02-12 PROCEDURE — 82607 VITAMIN B-12: CPT | Performed by: STUDENT IN AN ORGANIZED HEALTH CARE EDUCATION/TRAINING PROGRAM

## 2025-02-12 PROCEDURE — 258N000003 HC RX IP 258 OP 636

## 2025-02-12 PROCEDURE — 99232 SBSQ HOSP IP/OBS MODERATE 35: CPT | Performed by: STUDENT IN AN ORGANIZED HEALTH CARE EDUCATION/TRAINING PROGRAM

## 2025-02-12 PROCEDURE — 250N000011 HC RX IP 250 OP 636

## 2025-02-12 PROCEDURE — 120N000001 HC R&B MED SURG/OB

## 2025-02-12 RX ORDER — DIPHENHYDRAMINE HCL 25 MG
50 CAPSULE ORAL EVERY 6 HOURS PRN
Status: DISCONTINUED | OUTPATIENT
Start: 2025-02-12 | End: 2025-02-13 | Stop reason: HOSPADM

## 2025-02-12 RX ORDER — POLYETHYLENE GLYCOL 3350 17 G/17G
17 POWDER, FOR SOLUTION ORAL DAILY
Status: DISCONTINUED | OUTPATIENT
Start: 2025-02-12 | End: 2025-02-13 | Stop reason: HOSPADM

## 2025-02-12 RX ADMIN — GABAPENTIN 200 MG: 100 CAPSULE ORAL at 07:57

## 2025-02-12 RX ADMIN — DIPHENHYDRAMINE HYDROCHLORIDE 50 MG: 25 CAPSULE ORAL at 06:56

## 2025-02-12 RX ADMIN — ACETAMINOPHEN 650 MG: 325 TABLET, FILM COATED ORAL at 10:00

## 2025-02-12 RX ADMIN — ACETAMINOPHEN 650 MG: 325 TABLET, FILM COATED ORAL at 16:38

## 2025-02-12 RX ADMIN — SODIUM CHLORIDE, POTASSIUM CHLORIDE, SODIUM LACTATE AND CALCIUM CHLORIDE: 600; 310; 30; 20 INJECTION, SOLUTION INTRAVENOUS at 10:14

## 2025-02-12 RX ADMIN — GABAPENTIN 200 MG: 100 CAPSULE ORAL at 22:40

## 2025-02-12 RX ADMIN — GABAPENTIN 200 MG: 100 CAPSULE ORAL at 13:20

## 2025-02-12 RX ADMIN — OXYCODONE 5 MG: 5 TABLET ORAL at 08:04

## 2025-02-12 RX ADMIN — OXYCODONE 5 MG: 5 TABLET ORAL at 19:04

## 2025-02-12 RX ADMIN — ONDANSETRON 4 MG: 4 TABLET, ORALLY DISINTEGRATING ORAL at 05:58

## 2025-02-12 RX ADMIN — POLYETHYLENE GLYCOL 3350 17 G: 17 POWDER, FOR SOLUTION ORAL at 12:16

## 2025-02-12 RX ADMIN — ACETAMINOPHEN 650 MG: 325 TABLET, FILM COATED ORAL at 04:51

## 2025-02-12 RX ADMIN — ACETAMINOPHEN 650 MG: 325 TABLET, FILM COATED ORAL at 22:40

## 2025-02-12 ASSESSMENT — ACTIVITIES OF DAILY LIVING (ADL)
ADLS_ACUITY_SCORE: 25
ADLS_ACUITY_SCORE: 27
ADLS_ACUITY_SCORE: 25
ADLS_ACUITY_SCORE: 27
ADLS_ACUITY_SCORE: 25
ADLS_ACUITY_SCORE: 25
ADLS_ACUITY_SCORE: 27
ADLS_ACUITY_SCORE: 25
ADLS_ACUITY_SCORE: 27
ADLS_ACUITY_SCORE: 25
ADLS_ACUITY_SCORE: 27
ADLS_ACUITY_SCORE: 25

## 2025-02-12 NOTE — PLAN OF CARE
Problem: Adult Inpatient Plan of Care  Goal: Optimal Comfort and Wellbeing  Outcome: Progressing     Problem: Adult Inpatient Plan of Care  Goal: Optimal Comfort and Wellbeing  Outcome: Progressing   Goal Outcome Evaluation:      Plan of Care Reviewed With: patient           Patients pain controlled on oral pain medication states she feels constipated medications given for bowels

## 2025-02-12 NOTE — PROGRESS NOTES
[facility]    Medicine Progress Note - Hospitalist Service    Date of Admission:  2/10/2025    Assessment & Plan    Felisha Zapata is a 34 year old female with past medical hx of afib, tobacco use disorder admitted on 2/10/2025. She presented for LUQ pain similar to her prior episode of alcohol induced pancreatitis.     Alcohol induced pancreatitis  Anion gap elevation, likely due to starvation ketosis    Hx of alcoholic pancreatitis 9/2024 treated as outpatient. CT at that time suggested possible pancreatic necrosis and recommended a follow up CT, which has not been obtained.     Her father passed away 1/30, and she has been drinking alcohol heavily since then, last drink 2/7 pm. She presented with LUQ pain, poor PO intake, no bowel movement, no flatus, nausea since 2/8. She had one episode of emesis 2/11. She states this feels similar to her previous episode of pancreatitis. She presented with a lipase of 632, anion gap 16, Tbili 1.7. RUQ U/S unremarkable. CT a/p with evidence of acute interstitial pancreatitis without evidence of complications or fluid collections.  Lipase improved 381 ? 223.     2/12: Patient continued to have intermittent abdominal pain following p.o. intake.  Discussed removing IV PRNs given oral oxycodone has been effective.  Discussed plan to advance to low-fat diet with smaller intermittent meals today.  Pain still 7-8 prior to oxycodone and worsens with food intake so discussed needing to be able to tolerate diet prior to going home.  - Tylenol 650 mg every 6 hours, gabapentin 200 mg 3 times daily, and oxycodone as needed  - Discontinued Dilaudid as needed  - Liquid diet, advance as tolerated  - Continue LR 150ml/hr    Constipation  Has not had a bowel movement since admission and did have a stool softener and prune juice yesterday evening.  However, patient without much intake prior to admission for 48-72 hours.  - MiraLAX and senna as  "needed    Normocytic anemia  Thrombocytopenia  Patient with hemoglobin 13.7 on presentation and drifted to 11.5 with IVF.  Platelets 184 on presentation and drifted to 140.  Likely component of dilution given degree of poor intake for last couple days prior to admission.  Iron 20, iron sat 8%, TIBC 260, ferritin 563 indicating some acute phase reactant.  Hemoglobin 10.1 on a.m. check  - Continue to monitor  - Likely benefit from repeat CBC in 3 months with PCP for resolution of anemia  - B12 pending    Atrial fibrillation, paroxysmal  Hx of afib s/p cardioversion in 2013. Not on anticoagulation. CHADsVASc 1.    Hyperlipidemia  Obesity  Cholesterol 214, triglycerides 328, LDL 70.  Triglycerides not in a range that would typically be considered to trigger for pancreatitis.  Likely some underlying metabolic syndrome        Diet: Advance Diet as Tolerated: Clear Liquid Diet; Low Fat Diet    DVT Prophylaxis: Low Risk/Ambulatory with no VTE prophylaxis indicated  Judge Catheter: Not present  Lines: None     Cardiac Monitoring: None  Code Status: Full Code      Clinically Significant Risk Factors          # Hypochloremia: Lowest Cl = 96 mmol/L in last 2 days, will monitor as appropriate                     # Obesity: Estimated body mass index is 30.01 kg/m  as calculated from the following:    Height as of this encounter: 1.702 m (5' 7\").    Weight as of this encounter: 86.9 kg (191 lb 9.3 oz)., PRESENT ON ADMISSION            Social Drivers of Health    Tobacco Use: Medium Risk (2/10/2022)    Patient History     Smoking Tobacco Use: Former     Smokeless Tobacco Use: Never          Disposition Plan     Medically Ready for Discharge: Anticipated Tomorrow             Geremias Dao DO  Hospitalist Service  [facility]  Securely message with [employer] (more info)  Text page via [facility] Paging/Directory   ______________________________________________________________________    Interval History   No acute " events overnight.  Patient reports improving pain, however had worsening pain with broth.  Having sips of fluids but still having recurrent abdominal pain.  Discussed continuing pain medications and IVF.  Will advance diet as tolerated.    Physical Exam   Vital Signs: Temp: 98.1  F (36.7  C) Temp src: Oral BP: (!) 147/101 Pulse: 82   Resp: 16 SpO2: 94 % O2 Device: None (Room air)    Weight: 191 lbs 9.28 oz    Constitutional: awake, alert, cooperative, no apparent distress, and appears stated age  Respiratory: No increased work of breathing, good air exchange, clear to auscultation bilaterally, no crackles or wheezing  Cardiovascular: Normal apical impulse, regular rate and rhythm, normal S1 and S2, no S3 or S4, and no murmur noted  GI: Pain to deep palpation periumbilically (improved), no peritoneal signs, bowel sounds present    Medical Decision Making       35 MINUTES SPENT BY ME on the date of service doing chart review, history, exam, documentation & further activities per the note.      Data     I have personally reviewed the following data over the past 24 hrs:    7.1  \   10.1 (L)   / 141 (L)     139 99 4.8 (L) /  97   3.8 27 0.65 \     ALT: N/A AST: N/A AP: N/A TBILI: N/A   ALB: N/A TOT PROTEIN: N/A LIPASE: 223 (H)     Ferritin:  563 (H) % Retic:  N/A LDH:  N/A       Imaging results reviewed over the past 24 hrs:   No results found for this or any previous visit (from the past 24 hours).

## 2025-02-12 NOTE — PLAN OF CARE
Problem: Pancreatitis  Goal: Fluid and Electrolyte Balance  Intervention: Monitor and Manage Fluid and Electrolyte Balance  Flowsheets (Taken 2/12/2025 0145)  Fluid/Electrolyte Management:   fluids provided   intravenous fluids adjusted     Problem: Pancreatitis  Goal: Absence of Infection Signs and Symptoms  Intervention: Prevent or Manage Infection  Flowsheets (Taken 2/12/2025 0145)  Fever Reduction/Comfort Measures:   cool cloth applied   lightweight bedding   fluid intake increased   Goal Outcome Evaluation:  Alert and oriented. Receiving tylenol, oxycodone, and dilaudid for pain management. Continues to have abdominal discomfort. On a clear liquid diet, but started to feel nauseas this morning. Prn Zofran given. Patient also reports that her face feels flushed and her left eye feels swollen. Provider notified and ordered benadryl  . Ambulates in room independently.

## 2025-02-13 VITALS
RESPIRATION RATE: 16 BRPM | WEIGHT: 191.58 LBS | OXYGEN SATURATION: 98 % | DIASTOLIC BLOOD PRESSURE: 105 MMHG | SYSTOLIC BLOOD PRESSURE: 152 MMHG | HEIGHT: 67 IN | HEART RATE: 75 BPM | TEMPERATURE: 97.9 F | BODY MASS INDEX: 30.07 KG/M2

## 2025-02-13 LAB
ALBUMIN SERPL BCG-MCNC: 4 G/DL (ref 3.5–5.2)
ALP SERPL-CCNC: 229 U/L (ref 40–150)
ALT SERPL W P-5'-P-CCNC: 31 U/L (ref 0–50)
AST SERPL W P-5'-P-CCNC: 47 U/L (ref 0–45)
BILIRUB DIRECT SERPL-MCNC: 0.85 MG/DL (ref 0–0.3)
BILIRUB SERPL-MCNC: 1.6 MG/DL
LIPASE SERPL-CCNC: 202 U/L (ref 13–60)
PROT SERPL-MCNC: 7 G/DL (ref 6.4–8.3)
VIT B12 SERPL-MCNC: 377 PG/ML (ref 232–1245)

## 2025-02-13 PROCEDURE — 250N000013 HC RX MED GY IP 250 OP 250 PS 637: Performed by: STUDENT IN AN ORGANIZED HEALTH CARE EDUCATION/TRAINING PROGRAM

## 2025-02-13 PROCEDURE — 83690 ASSAY OF LIPASE: CPT | Performed by: INTERNAL MEDICINE

## 2025-02-13 PROCEDURE — 36415 COLL VENOUS BLD VENIPUNCTURE: CPT | Performed by: INTERNAL MEDICINE

## 2025-02-13 PROCEDURE — 84450 TRANSFERASE (AST) (SGOT): CPT | Performed by: INTERNAL MEDICINE

## 2025-02-13 PROCEDURE — 258N000003 HC RX IP 258 OP 636

## 2025-02-13 PROCEDURE — 250N000013 HC RX MED GY IP 250 OP 250 PS 637

## 2025-02-13 RX ADMIN — OXYCODONE 5 MG: 5 TABLET ORAL at 16:28

## 2025-02-13 RX ADMIN — SODIUM CHLORIDE, POTASSIUM CHLORIDE, SODIUM LACTATE AND CALCIUM CHLORIDE: 600; 310; 30; 20 INJECTION, SOLUTION INTRAVENOUS at 12:51

## 2025-02-13 RX ADMIN — SODIUM CHLORIDE, POTASSIUM CHLORIDE, SODIUM LACTATE AND CALCIUM CHLORIDE: 600; 310; 30; 20 INJECTION, SOLUTION INTRAVENOUS at 00:28

## 2025-02-13 RX ADMIN — POLYETHYLENE GLYCOL 3350 17 G: 17 POWDER, FOR SOLUTION ORAL at 08:52

## 2025-02-13 RX ADMIN — OXYCODONE 5 MG: 5 TABLET ORAL at 11:25

## 2025-02-13 RX ADMIN — ACETAMINOPHEN 650 MG: 325 TABLET, FILM COATED ORAL at 10:00

## 2025-02-13 RX ADMIN — GABAPENTIN 200 MG: 100 CAPSULE ORAL at 14:59

## 2025-02-13 RX ADMIN — SODIUM CHLORIDE, POTASSIUM CHLORIDE, SODIUM LACTATE AND CALCIUM CHLORIDE: 600; 310; 30; 20 INJECTION, SOLUTION INTRAVENOUS at 06:42

## 2025-02-13 RX ADMIN — GABAPENTIN 200 MG: 100 CAPSULE ORAL at 08:52

## 2025-02-13 RX ADMIN — ACETAMINOPHEN 650 MG: 325 TABLET, FILM COATED ORAL at 16:28

## 2025-02-13 ASSESSMENT — ACTIVITIES OF DAILY LIVING (ADL)
ADLS_ACUITY_SCORE: 30
ADLS_ACUITY_SCORE: 28
ADLS_ACUITY_SCORE: 30
ADLS_ACUITY_SCORE: 30
ADLS_ACUITY_SCORE: 28
ADLS_ACUITY_SCORE: 28
ADLS_ACUITY_SCORE: 30
ADLS_ACUITY_SCORE: 28
ADLS_ACUITY_SCORE: 28
ADLS_ACUITY_SCORE: 30
ADLS_ACUITY_SCORE: 28
ADLS_ACUITY_SCORE: 30
ADLS_ACUITY_SCORE: 28

## 2025-02-13 NOTE — PLAN OF CARE
Pain controlled with PRN oral oxycodone, ate approximately 30% of lunch tray, denies nausea reports mild increase in discomfort in epigastric area.  Patient independent in room, LR infusing at 150 mL/hr.  Slight yellowing of sclera, initial total bili elevated no repeat labs today  Problem: Adult Inpatient Plan of Care  Goal: Plan of Care Review  Description: The Plan of Care Review/Shift note should be completed every shift.  The Outcome Evaluation is a brief statement about your assessment that the patient is improving, declining, or no change.  This information will be displayed automatically on your shift  note.  Outcome: Progressing  Flowsheets (Taken 2/13/2025 0599)  Plan of Care Reviewed With: patient  Overall Patient Progress: improving     Problem: Adult Inpatient Plan of Care  Goal: Optimal Comfort and Wellbeing  Outcome: Progressing  Intervention: Provide Person-Centered Care  Recent Flowsheet Documentation  Taken 2/13/2025 7453 by Tennille Claros, RN  Trust Relationship/Rapport:   care explained   choices provided   emotional support provided   empathic listening provided   questions answered   questions encouraged   reassurance provided   thoughts/feelings acknowledged     Problem: Pancreatitis  Goal: Optimal Pain Control and Function  Outcome: Progressing   Goal Outcome Evaluation:      Plan of Care Reviewed With: patient    Overall Patient Progress: improvingOverall Patient Progress: improving

## 2025-02-13 NOTE — PLAN OF CARE
Problem: Pancreatitis  Goal: Fluid and Electrolyte Balance  Outcome: Progressing  Intervention: Monitor and Manage Fluid and Electrolyte Balance  Recent Flowsheet Documentation  Taken 2/13/2025 0000 by Devick, Karen M, RN  Fluid/Electrolyte Management: fluids provided  Goal: Absence of Infection Signs and Symptoms  Outcome: Progressing  Goal: Optimal Nutrition Delivery  Outcome: Progressing  Goal: Optimal Pain Control and Function  Outcome: Progressing  Goal: Effective Oxygenation and Ventilation  Outcome: Progressing  Intervention: Optimize Oxygenation and Ventilation  Recent Flowsheet Documentation  Taken 2/13/2025 0000 by Devick, Karen M, RN  Cough And Deep Breathing: done independently per patient  Activity Management: up ad chanel     Goal Outcome Evaluation: Patient is alert and oriented. Up independently in room. On clear liquid diet. LR at 150 ml/hr. Patient has been experiencing upper abdominal pain; receives PRN oxycodone for relief.

## 2025-02-14 NOTE — PLAN OF CARE
[facility] DISCHARGE NOTE    Patient discharged to home at 9:37 PM via self. Discharge instructions reviewed with patient, opportunity offered to ask questions. Prescriptions - None ordered for discharge. All belongings sent with patient.    OG SALDIVAR RN

## 2025-04-01 ENCOUNTER — OFFICE VISIT (OUTPATIENT)
Dept: FAMILY MEDICINE | Facility: CLINIC | Age: 35
End: 2025-04-01
Payer: COMMERCIAL

## 2025-04-01 VITALS
WEIGHT: 189 LBS | HEART RATE: 81 BPM | OXYGEN SATURATION: 98 % | SYSTOLIC BLOOD PRESSURE: 124 MMHG | BODY MASS INDEX: 29.66 KG/M2 | RESPIRATION RATE: 14 BRPM | TEMPERATURE: 98.1 F | HEIGHT: 67 IN | DIASTOLIC BLOOD PRESSURE: 80 MMHG

## 2025-04-01 DIAGNOSIS — Z12.4 CERVICAL CANCER SCREENING: Primary | ICD-10-CM

## 2025-04-01 DIAGNOSIS — K85.20 ALCOHOL-INDUCED ACUTE PANCREATITIS, UNSPECIFIED COMPLICATION STATUS: ICD-10-CM

## 2025-04-01 DIAGNOSIS — R35.0 URINARY FREQUENCY: ICD-10-CM

## 2025-04-01 DIAGNOSIS — F10.90 ALCOHOL USE DISORDER: ICD-10-CM

## 2025-04-01 LAB
ALBUMIN SERPL BCG-MCNC: 4.4 G/DL (ref 3.5–5.2)
ALBUMIN UR-MCNC: 30 MG/DL
ALP SERPL-CCNC: 56 U/L (ref 40–150)
ALT SERPL W P-5'-P-CCNC: 12 U/L (ref 0–50)
ANION GAP SERPL CALCULATED.3IONS-SCNC: 12 MMOL/L (ref 7–15)
APPEARANCE UR: CLEAR
AST SERPL W P-5'-P-CCNC: 14 U/L (ref 0–45)
BACTERIA #/AREA URNS HPF: ABNORMAL /HPF
BILIRUB SERPL-MCNC: 1.2 MG/DL
BILIRUB UR QL STRIP: NEGATIVE
BUN SERPL-MCNC: 20.3 MG/DL (ref 6–20)
CALCIUM SERPL-MCNC: 9.7 MG/DL (ref 8.8–10.4)
CHLORIDE SERPL-SCNC: 101 MMOL/L (ref 98–107)
CHOLEST SERPL-MCNC: 158 MG/DL
COLOR UR AUTO: YELLOW
CREAT SERPL-MCNC: 0.92 MG/DL (ref 0.51–0.95)
EGFRCR SERPLBLD CKD-EPI 2021: 82 ML/MIN/1.73M2
ERYTHROCYTE [DISTWIDTH] IN BLOOD BY AUTOMATED COUNT: 12.3 % (ref 10–15)
EST. AVERAGE GLUCOSE BLD GHB EST-MCNC: 88 MG/DL
FASTING STATUS PATIENT QL REPORTED: NO
FASTING STATUS PATIENT QL REPORTED: NO
GLUCOSE SERPL-MCNC: 99 MG/DL (ref 70–99)
GLUCOSE UR STRIP-MCNC: NEGATIVE MG/DL
HBA1C MFR BLD: 4.7 % (ref 0–5.6)
HCO3 SERPL-SCNC: 28 MMOL/L (ref 22–29)
HCT VFR BLD AUTO: 36.4 % (ref 35–47)
HDLC SERPL-MCNC: 53 MG/DL
HGB BLD-MCNC: 12.6 G/DL (ref 11.7–15.7)
HGB UR QL STRIP: ABNORMAL
KETONES UR STRIP-MCNC: NEGATIVE MG/DL
LDLC SERPL CALC-MCNC: 54 MG/DL
LEUKOCYTE ESTERASE UR QL STRIP: ABNORMAL
LIPASE SERPL-CCNC: 46 U/L (ref 13–60)
MCH RBC QN AUTO: 32.5 PG (ref 26.5–33)
MCHC RBC AUTO-ENTMCNC: 34.6 G/DL (ref 31.5–36.5)
MCV RBC AUTO: 94 FL (ref 78–100)
MUCOUS THREADS #/AREA URNS LPF: PRESENT /LPF
NITRATE UR QL: NEGATIVE
NONHDLC SERPL-MCNC: 105 MG/DL
PH UR STRIP: 5.5 [PH] (ref 5–7)
PLATELET # BLD AUTO: 196 10E3/UL (ref 150–450)
POTASSIUM SERPL-SCNC: 4 MMOL/L (ref 3.4–5.3)
PROT SERPL-MCNC: 7 G/DL (ref 6.4–8.3)
RBC # BLD AUTO: 3.88 10E6/UL (ref 3.8–5.2)
RBC #/AREA URNS AUTO: ABNORMAL /HPF
SODIUM SERPL-SCNC: 141 MMOL/L (ref 135–145)
SP GR UR STRIP: >=1.03 (ref 1–1.03)
SQUAMOUS #/AREA URNS AUTO: ABNORMAL /LPF
TRIGL SERPL-MCNC: 253 MG/DL
UROBILINOGEN UR STRIP-ACNC: 0.2 E.U./DL
WBC # BLD AUTO: 8.7 10E3/UL (ref 4–11)
WBC #/AREA URNS AUTO: ABNORMAL /HPF

## 2025-04-01 PROCEDURE — G2211 COMPLEX E/M VISIT ADD ON: HCPCS | Performed by: FAMILY MEDICINE

## 2025-04-01 PROCEDURE — 83036 HEMOGLOBIN GLYCOSYLATED A1C: CPT | Performed by: FAMILY MEDICINE

## 2025-04-01 PROCEDURE — 80053 COMPREHEN METABOLIC PANEL: CPT | Performed by: FAMILY MEDICINE

## 2025-04-01 PROCEDURE — 80061 LIPID PANEL: CPT | Performed by: FAMILY MEDICINE

## 2025-04-01 PROCEDURE — 99459 PELVIC EXAMINATION: CPT | Performed by: FAMILY MEDICINE

## 2025-04-01 PROCEDURE — 83690 ASSAY OF LIPASE: CPT | Performed by: FAMILY MEDICINE

## 2025-04-01 PROCEDURE — 99204 OFFICE O/P NEW MOD 45 MIN: CPT | Performed by: FAMILY MEDICINE

## 2025-04-01 PROCEDURE — 87624 HPV HI-RISK TYP POOLED RSLT: CPT | Performed by: FAMILY MEDICINE

## 2025-04-01 PROCEDURE — 36415 COLL VENOUS BLD VENIPUNCTURE: CPT | Performed by: FAMILY MEDICINE

## 2025-04-01 PROCEDURE — 81001 URINALYSIS AUTO W/SCOPE: CPT | Performed by: FAMILY MEDICINE

## 2025-04-01 PROCEDURE — 85027 COMPLETE CBC AUTOMATED: CPT | Performed by: FAMILY MEDICINE

## 2025-04-01 PROCEDURE — 87086 URINE CULTURE/COLONY COUNT: CPT | Performed by: FAMILY MEDICINE

## 2025-04-01 ASSESSMENT — PAIN SCALES - GENERAL: PAINLEVEL_OUTOF10: NO PAIN (0)

## 2025-04-01 NOTE — PROGRESS NOTES
"  Assessment & Plan     Cervical cancer screening    - HPV and Gynecologic Cytology Panel - Recommended Age 30 - 65 Years    Alcohol-induced acute pancreatitis, unspecified complication status  Reviewed at length ETOH abstinence   Offered meds - she decline now   Offered support with treatment and she feels like she can quit  - CBC with platelets; Future  - Hemoglobin A1c; Future  - Comprehensive metabolic panel; Future  - Lipid panel reflex to direct LDL Fasting; Future  - Lipase; Future  - Lipase  - Lipid panel reflex to direct LDL Fasting  - Comprehensive metabolic panel  - Hemoglobin A1c  - CBC with platelets    Urinary frequency  Followup based on the above results.   - UA Macroscopic with reflex to Microscopic and Culture - Lab Collect; Future  - UA Macroscopic with reflex to Microscopic and Culture - Lab Collect  - UA Microscopic with Reflex to Culture    Alcohol use disorder  As above     The longitudinal plan of care for the diagnosis(es)/condition(s) as documented were addressed during this visit. Due to the added complexity in care, I will continue to support Felisha in the subsequent management and with ongoing continuity of care.      MED REC REQUIRED  Post Medication Reconciliation Status: discharge medications reconciled, continue medications without change  BMI  Estimated body mass index is 29.6 kg/m  as calculated from the following:    Height as of this encounter: 1.702 m (5' 7\").    Weight as of this encounter: 85.7 kg (189 lb).   Weight management plan: Discussed healthy diet and exercise guidelines          Subjective   Felisha is a 34 year old, presenting for the following health issues:  Pancreatitis (Follow up hospital)      4/1/2025     9:25 AM   Additional Questions   Roomed by Elinor jacobs   Accompanied by Self         4/1/2025     9:25 AM   Patient Reported Additional Medications   Patient reports taking the following new medications .     HPI      Pt was hospitalized 2/10/25 for " "pancreatitis   Pt here for follow up  No abdominal pain  Mild alcohol usage -quit right after hospitalization but has had a few since  She has been trying to watch diet   Has had no pain   She is a  and so 'drinking was part of the job'  She has been just drinking water at work   She states she drank daily prior to hosp    She has no cravings   She feels comortable she can quit       Genitourinary - Female  Onset/Duration: 1 week  Description:   Painful urination (Dysuria): No           Frequency: YES  Blood in urine (Hematuria): No  Delay in urine (Hesitency): No  Intensity: mild  Progression of Symptoms:  worsening  Accompanying Signs & Symptoms:  Fever/chills: No  Flank pain: No  Nausea and vomiting: No  Vaginal symptoms: none  Abdominal/Pelvic Pain: No  History:   History of frequent UTI s: No  History of kidney stones: No  Sexually Active: YES  Possibility of pregnancy: No  Precipitating or alleviating factors: None  Therapies tried and outcome: none        Review of Systems  Constitutional, HEENT, cardiovascular, pulmonary, gi and gu systems are negative, except as otherwise noted.      Objective    BP (!) 120/92   Pulse 81   Temp 98.1  F (36.7  C) (Tympanic)   Resp 14   Ht 1.702 m (5' 7\")   Wt 85.7 kg (189 lb)   SpO2 98%   BMI 29.60 kg/m    Body mass index is 29.6 kg/m .  Physical Exam   GENERAL: alert and no distress  EYES: Eyes grossly normal to inspection, PERRL and conjunctivae and sclerae normal  HENT: ear canals and TM's normal, nose and mouth without ulcers or lesions  NECK: no adenopathy, no asymmetry, masses, or scars  RESP: lungs clear to auscultation - no rales, rhonchi or wheezes  CV: regular rate and rhythm, normal S1 S2, no S3 or S4, no murmur, click or rub, no peripheral edema  ABDOMEN: soft, nontender, no hepatosplenomegaly, no masses and bowel sounds normal   (female): normal female external genitalia, normal urethral meatus, normal vaginal mucosa  MS: no gross " musculoskeletal defects noted, no edema  SKIN: no suspicious lesions or rashes  NEURO: Normal strength and tone, mentation intact and speech normal  PSYCH: mentation appears normal, affect normal/bright            Signed Electronically by: Chayo Mccord MD

## 2025-04-01 NOTE — NURSING NOTE
"Chief Complaint   Patient presents with    Pancreatitis     Follow up hospital     BP (!) 120/92   Pulse 81   Temp 98.1  F (36.7  C) (Tympanic)   Resp 14   Ht 1.702 m (5' 7\")   Wt 85.7 kg (189 lb)   SpO2 98%   BMI 29.60 kg/m   Estimated body mass index is 29.6 kg/m  as calculated from the following:    Height as of this encounter: 1.702 m (5' 7\").    Weight as of this encounter: 85.7 kg (189 lb).  Patient presents to the clinic using No DME      Health Maintenance that is potentially due pending provider review:    Health Maintenance Due   Topic Date Due    ANNUAL REVIEW OF HM ORDERS  Never done    ADVANCE CARE PLANNING  Never done    HEPATITIS B IMMUNIZATION (4 of 4 - 4-dose series) 12/18/2001    Pneumococcal Vaccine: Pediatrics (0 to 5 Years) and At-Risk Patients (6 to 49 Years) (1 of 2 - PCV) Never done    YEARLY PREVENTIVE VISIT  02/03/2022    HPV IMMUNIZATION (3 - 3-dose series) 05/05/2022    PAP FOLLOW-UP  08/10/2022    HPV FOLLOW-UP  08/10/2022    INFLUENZA VACCINE (1) 09/01/2024    COVID-19 Vaccine (1 - 2024-25 season) Never done                  "

## 2025-04-02 LAB — BACTERIA UR CULT: NORMAL

## 2025-04-03 LAB
HPV HR 12 DNA CVX QL NAA+PROBE: NEGATIVE
HPV16 DNA CVX QL NAA+PROBE: POSITIVE
HPV18 DNA CVX QL NAA+PROBE: NEGATIVE
HUMAN PAPILLOMA VIRUS FINAL DIAGNOSIS: ABNORMAL

## 2025-04-07 ENCOUNTER — PATIENT OUTREACH (OUTPATIENT)
Dept: FAMILY MEDICINE | Facility: CLINIC | Age: 35
End: 2025-04-07
Payer: COMMERCIAL

## 2025-04-07 DIAGNOSIS — D06.9 SEVERE DYSPLASIA OF CERVIX (CIN III): Primary | ICD-10-CM

## 2025-05-12 ENCOUNTER — TELEPHONE (OUTPATIENT)
Dept: FAMILY MEDICINE | Facility: CLINIC | Age: 35
End: 2025-05-12
Payer: COMMERCIAL

## 2025-05-12 NOTE — TELEPHONE ENCOUNTER
Reason for Call:  Appointment Request    Patient requesting this type of appt:  COLPOSCOPY     Requested provider: Chayo Mccord    Reason patient unable to be scheduled: Needs to be scheduled by clinic    When does patient want to be seen/preferred time: ASAP, MONDAYS AND THURSDAYS ANYTIME     Comments: APPT NEEDS TO BE SCHEDULED BY CLINIC     Could we send this information to you in [facility] or would you prefer to receive a phone call?:   Patient would prefer a phone call   Okay to leave a detailed message?: Yes at Cell number on file:    Telephone Information:   Mobile [phone]       Call taken on 5/12/2025 at 10:06 AM by Kezia Castañeda

## 2025-06-21 ENCOUNTER — HEALTH MAINTENANCE LETTER (OUTPATIENT)
Age: 35
End: 2025-06-21

## 2025-07-23 ENCOUNTER — HOSPITAL ENCOUNTER (EMERGENCY)
Facility: CLINIC | Age: 35
Discharge: HOME | End: 2025-07-23
Payer: COMMERCIAL

## 2025-07-23 VITALS
HEART RATE: 102 BPM | OXYGEN SATURATION: 97 % | SYSTOLIC BLOOD PRESSURE: 158 MMHG | RESPIRATION RATE: 18 BRPM | TEMPERATURE: 97.9 F | DIASTOLIC BLOOD PRESSURE: 93 MMHG

## 2025-07-23 DIAGNOSIS — M79.672 LEFT FOOT PAIN: Primary | ICD-10-CM

## 2025-07-23 PROCEDURE — G0463 HOSPITAL OUTPT CLINIC VISIT: HCPCS

## 2025-07-23 PROCEDURE — 99213 OFFICE O/P EST LOW 20 MIN: CPT

## 2025-07-23 ASSESSMENT — ACTIVITIES OF DAILY LIVING (ADL): ADLS_ACUITY_SCORE: 46

## 2025-07-23 ASSESSMENT — COLUMBIA-SUICIDE SEVERITY RATING SCALE - C-SSRS
1. IN THE PAST MONTH, HAVE YOU WISHED YOU WERE DEAD OR WISHED YOU COULD GO TO SLEEP AND NOT WAKE UP?: NO
2. HAVE YOU ACTUALLY HAD ANY THOUGHTS OF KILLING YOURSELF IN THE PAST MONTH?: NO
6. HAVE YOU EVER DONE ANYTHING, STARTED TO DO ANYTHING, OR PREPARED TO DO ANYTHING TO END YOUR LIFE?: NO

## 2025-07-23 NOTE — ED TRIAGE NOTES
Rolled ankle on Sunday and now has been painful every day since on the LT foot.  Rachel Bynum MA

## 2025-07-23 NOTE — ED PROVIDER NOTES
History     Chief Complaint   Patient presents with    Foot Pain     LT     HPI  Felisha Zapata is a 35 year old female who presents to  urgent care with chief complaint of left foot pain.  Patient reports she rolled her ankle 3 days ago.  Patient reports she had some mild pain after rolling her ankle however she developed worsening left-sided midfoot pain throughout the day.  She reports continued midfoot pain since that time worse with walking.  She has attempted to brace her foot and elevate without improvement of symptoms.    Allergies:  No Known Allergies    Problem List:    Patient Active Problem List    Diagnosis Date Noted    Alcohol-induced acute pancreatitis, unspecified complication status 02/11/2025     Priority: Medium    Alcohol use disorder 02/11/2025     Priority: Medium    Hypertriglyceridemia 02/11/2025     Priority: Medium    mirena IUD 2/10/2022 02/10/2022     Priority: Medium     Mirena IUD placed 2/10/2022  LOT# [id number]  Exp: 04/2024  NDC# 48082-049-31    Rena Delgado on 2/10/2022 at 11:33 AM          Atrial fibrillation (H) 02/17/2013     Priority: Medium    CARDIOVASCULAR SCREENING; LDL GOAL LESS THAN 160 10/31/2010     Priority: Medium    Severe dysplasia of cervix (SANA III) 10/10/2007     Priority: Medium     10/1/07 LSIL. Rec colpo.  1/3/08 LSIL, + HPV 58. Rec Colpo  2/11/08 LSIL. Colpo-SANA 3. Rec LEEP.  4/24/08 LEEP SANA 3, + endocervical margins.   4/15/09 NIL. Repeat pap 1 year. Needs yearly paps for 20 years (2028).  1/20/10 NIL. Repeat pap 1 year.  10/27/11 NIL Pap, + HR HPV. (Care Everywhere)  4/4/13 NIL. Pap in 1 year.  9/5/14 NIL, +HR HPV type 16. Plan colp  9/19/14 [place] - Negative for dysplasia. Plan cotest in 1 year.    10/1/2015 NIL, +HR HPV type 16. Plan [place]-  1/6/16: [place] not done. Tracking updated for 6 mo pap.   6/22/16 NIL Pap, + HR HPV 16. Plan colp  8/9/16 [place] - negative. Plan cotest in 1 year.   10/16/18 [facility] Pap, + HR HPV 16 (neg 18 & other). Plan  colp  6/7/19 Lost to follow-up for pap tracking  2/13/20 [facility] pap, + HR HPV 16. Plan: colpo  10/07/20 Patient is lost to pap tracking follow-up.   2/3/21 ASC[facility] Pap, + HR HPV 16 (neg 18 & other). [facility] due by 5/3/21  8/20/21 Lost to follow-up for pap tracking  12/23/21 [facility] - SANA 2-3. LEEP due by 3/23/2022.  2/10/22 LEEP SANA 2-3, extending to margins, ECC minute fragment suspicious for HSIL. Plan: cotest in 6 months  3/24/23 Lost to follow-up for pap tracking   04/1/25 [facility] Pap, +HR HPV type 16 Plan [facility] due bef 07/1/25 04/7/25 Left msg and Mychart result note sent.  04/9/25 Left msg to call back or view Mychart result. Letter mailed.    6/13/25 [facility] appt -- no show  7/7/25 Reminder mychart      BMI 30.0-30.9,adult 10/16/2006     Priority: Medium    Tobacco use disorder 05/09/2005     Priority: Medium        Past Medical History:    Past Medical History:   Diagnosis Date    Abnormal Pap smear of cervix 2007    Cervical high risk HPV (human papillomavirus) test positive 2008    H/O colposcopy with cervical biopsy 02/2008    H/O colposcopy with cervical biopsy 09/2014    History of colposcopy 08/09/2016    IUD (intrauterine device) in place 2020    Tobacco use disorder        Past Surgical History:    Past Surgical History:   Procedure Laterality Date    ANESTHESIA CARDIOVERSION  2/18/2013    Procedure: ANESTHESIA CARDIOVERSION;  Cardioversion;  Surgeon: Provider, Generic Anesthesia;  Location: [facility] OR    LEEP TX, CERVICAL  4/2008    SANA 3       Family History:    Family History   Problem Relation Age of Onset    Eye Disorder Paternal Grandmother         glaucoma    Hypertension Paternal Grandfather     Connective Tissue Disorder Sister         psoriasis    Alcohol/Drug Mother     Depression Father        Social History:  Marital Status:  Single [1]  Social History     Tobacco Use    Smoking status: Former     Current packs/day: 0.00     Average packs/day: 0.5 packs/day for 11.0 years (5.5 ttl pk-yrs)     Types:  "Cigarettes     Start date: 1/13/2014     Quit date: 11/25/2021     Years since quitting: 3.6    Smokeless tobacco: Never    Tobacco comments:     quit on 11/26/2021   Vaping Use    Vaping status: Never Used   Substance Use Topics    Alcohol use: Yes     Alcohol/week: 0.0 standard drinks of alcohol     Comment: mixed 4-10 a week    Drug use: No     Comment: ho marijuana use and \"dabbling in coke and meth\" during high school, clean >4 yrs        Medications:    Ascorbic Acid (VITAMIN C ADULT GUMMIES PO)  BIOTIN PO  levonorgestrel (MIRENA) 20 MCG/24HR IUD  Multiple Vitamins-Minerals (WOMENS MULTI GUMMIES) CHEW          Review of Systems   All other systems reviewed and are negative.      Physical Exam   BP: (!) 158/93  Pulse: 102  Temp: 97.9  F (36.6  C)  Resp: 18  SpO2: 97 %      Physical Exam  Vitals and nursing note reviewed.   Constitutional:       General: She is not in acute distress.     Appearance: Normal appearance. She is not ill-appearing.   Cardiovascular:      Rate and Rhythm: Normal rate.      Pulses: Normal pulses.   Pulmonary:      Effort: Pulmonary effort is normal.   Musculoskeletal:      Comments: Generalized tenderness of the midfoot.  No ankle tenderness on exam.  No specific swelling or bruising.  Patient does have range of motion of the left foot and ankle.   Neurological:      Mental Status: She is alert.         ED Course        Procedures        Recent Results (from the past 24 hours)   Foot XR, G/E 3 views, left    Narrative    EXAM: XR ANKLE LEFT G/E 3 VIEWS, XR FOOT LEFT G/E 3 VIEWS  LOCATION: [facility]  DATE: 7/23/2025    INDICATION: Twisted ankle 3 days ago, having mid foot pain when walking  COMPARISON: None.      Impression    IMPRESSION: No acute fracture is identified. There is normal joint spacing and alignment. The ankle mortise is congruent. The talar dome is unremarkable. Tiny Achilles calcaneal enthesophyte.   XR Ankle Left G/E 3 Views    Narrative    " EXAM: XR ANKLE LEFT G/E 3 VIEWS, XR FOOT LEFT G/E 3 VIEWS  LOCATION: [facility]  DATE: 7/23/2025    INDICATION: Twisted ankle 3 days ago, having mid foot pain when walking  COMPARISON: None.      Impression    IMPRESSION: No acute fracture is identified. There is normal joint spacing and alignment. The ankle mortise is congruent. The talar dome is unremarkable. Tiny Achilles calcaneal enthesophyte.       Medications - No data to display    Assessments & Plan (with Medical Decision Making)     I have reviewed the nursing notes.    I have reviewed the findings, diagnosis, plan and need for follow up with the patient.      Medical Decision Making  35 year old female who presents to  urgent care with chief complaint of left foot pain.  Patient reports she rolled her ankle 3 days ago.  Patient reports she had some mild pain after rolling her ankle however she developed worsening left-sided midfoot pain throughout the day.  She reports continued midfoot pain since that time worse with walking.  She has attempted to brace her foot and elevate without improvement of symptoms.    On exam,Generalized tenderness of the midfoot.  No ankle tenderness on exam.  No specific swelling or bruising.  Patient does have range of motion of the left foot and ankle.    Left foot and ankle x-rays obtained personally interpreted revealing:  No acute fracture is identified. There is normal joint spacing and alignment. The ankle mortise is congruent. The talar dome is unremarkable. Tiny Achilles calcaneal enthesophyte.     Plan:Relative rest, activity only as tolerated by pain  Ice 15-20 minutes 3-4 times daily  Ibuprofen up to 600 mg every six hours  Follow up with PCP or sports medicine if no improvement in 7 days or sooner if new or worsening symptoms        Prior to making a final disposition on this patient the results of patient's tests and other diagnostic studies were discussed with the patient. All  questions were answered. Patient expressed understanding of the plan and was amenable to it.     Disclaimer: This note consists of symbols derived from keyboarding, dictation and/or voice recognition software. As a result, there may be errors in the script that have gone undetected. Please consider this when interpreting information found in this chart.        New Prescriptions    No medications on file       Final diagnoses:   Left foot pain       7/23/2025   [facility] EMERGENCY DEPT       Wendy Beltran PA-C  07/23/25 1600